# Patient Record
Sex: FEMALE | Race: WHITE | Employment: FULL TIME | URBAN - METROPOLITAN AREA
[De-identification: names, ages, dates, MRNs, and addresses within clinical notes are randomized per-mention and may not be internally consistent; named-entity substitution may affect disease eponyms.]

---

## 2017-02-24 ENCOUNTER — OFFICE VISIT (OUTPATIENT)
Dept: FAMILY MEDICINE | Facility: CLINIC | Age: 52
End: 2017-02-24
Payer: COMMERCIAL

## 2017-02-24 VITALS
HEART RATE: 86 BPM | SYSTOLIC BLOOD PRESSURE: 110 MMHG | DIASTOLIC BLOOD PRESSURE: 58 MMHG | WEIGHT: 163 LBS | TEMPERATURE: 97.1 F | BODY MASS INDEX: 27.83 KG/M2 | HEIGHT: 64 IN

## 2017-02-24 DIAGNOSIS — G89.29 CHRONIC RIGHT SHOULDER PAIN: Primary | ICD-10-CM

## 2017-02-24 DIAGNOSIS — M25.511 CHRONIC RIGHT SHOULDER PAIN: Primary | ICD-10-CM

## 2017-02-24 PROCEDURE — 99213 OFFICE O/P EST LOW 20 MIN: CPT | Performed by: INTERNAL MEDICINE

## 2017-02-24 NOTE — PROGRESS NOTES
"  SUBJECTIVE:                                                    Delores Desai is a 51 year old female who presents to clinic today for the following health issues:      Joint Pain     Onset: right shoulder pain     Description:   Location: right shoulder  Character: aches    Intensity: mild, moderate    Progression of Symptoms: intermittent    Accompanying Signs & Symptoms:  Other symptoms: none   History:   Previous similar pain: YES      Precipitating factors:   Trauma or overuse: no     Alleviating factors:  Improved by: NSAID - letting it hand a certain way  and advil        Therapies Tried and outcome: iced it, thinks it helped     Has had ongoing intermittent right shoulder pain for over a year.  About 5 years ago had rotator cuff issue, this feels similar.  She denies any past injuries. 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  She does not routinely do anything for physical activity.  She describes a dull ache in her right shoulder, some days worse than other.  Today barely has any pain.  Does seem to be better after she takes ibuprofen. There is no numbness, weakness, or pain radiating down the arm.         ROS:  Const, msk, neuro reviewed, negative unless noted above.     OBJECTIVE:                                                    /58 (BP Location: Right arm, Patient Position: Chair, Cuff Size: Adult Regular)  Pulse 86  Temp 97.1  F (36.2  C) (Tympanic)  Ht 5' 4\" (1.626 m)  Wt 163 lb (73.9 kg)  LMP 02/19/2017  BMI 27.98 kg/m2  Body mass index is 27.98 kg/(m^2).    Gen: pleasant, well appearing woman, no distress   R shoulder: non tender. No gross abnormalities. Full ROM with abduction, forward flexion, and internal and external rotation.  Full strength with abduction.  Negative malhotra. Mild pain with neer's test.          ASSESSMENT/PLAN:                                                        1. Chronic right shoulder pain  Likely rotator cuff tedonopathy.   - PARDEEP PT, HAND, AND " CHIROPRACTIC REFERRAL for PT  - naproxen BID daily x 1-2 weeks   - ice     F/U as needed for persistent or worsening symptoms.       Cathi Shi MD  [facility]

## 2017-02-24 NOTE — PATIENT INSTRUCTIONS
You can take Aleve (naproxen) 1-2 tabs twice daily with meals for 1-2 weeks, then take as needed.

## 2017-02-24 NOTE — MR AVS SNAPSHOT
After Visit Summary   2/24/2017    Delores Desai    MRN: [id number]           Patient Information     Date Of Birth          1965        Visit Information        Provider Department      2/24/2017 3:00 PM Cathi Shi MD [facility] [place]        Today's Diagnoses     Chronic right shoulder pain    -  1      Care Instructions    You can take Aleve (naproxen) 1-2 tabs twice daily with meals for 1-2 weeks, then take as needed.         Follow-ups after your visit        Additional Services     PARDEEP PT, HAND, AND CHIROPRACTIC REFERRAL       **This order will print in the [facility] Scheduling Office**    Physical Therapy, Hand Therapy and Chiropractic Care are available through:    *[facility]  *[facility]  *[facility]    Call one number to schedule at any of the above locations: [phone].    Your provider has referred you to: Physical Therapy at [facility] or [facility]    Indication/Reason for Referral: Shoulder Pain  Onset of Illness: >1 year ago  Therapy Orders: Evaluate and Treat  Special Programs: None  Special Request: None    John Lopez      Additional Comments for the Therapist or Chiropractor: concern for rotator cuff tendonitis     Please be aware that coverage of these services is subject to the terms and limitations of your health insurance plan.  Call member services at your health plan with any benefit or coverage questions.      Please bring the following to your appointment:    *Your personal calendar for scheduling future appointments  *Comfortable clothing                  Who to contact     If you have questions or need follow up information about today's clinic visit or your schedule please contact [facility] [place] directly at [phone].  Normal or non-critical lab and imaging results will be communicated to you by MyChart, letter or phone within 4 business days after the clinic has received the  "results. If you do not hear from us within 7 days, please contact the clinic through [facility] or phone. If you have a critical or abnormal lab result, we will notify you by phone as soon as possible.  Submit refill requests through [facility] or call your pharmacy and they will forward the refill request to us. Please allow 3 business days for your refill to be completed.          Additional Information About Your Visit        [facility] Information     [facility] gives you secure access to your electronic health record. If you see a primary care provider, you can also send messages to your care team and make appointments. If you have questions, please call your primary care clinic.  If you do not have a primary care provider, please call [phone] and they will assist you.        Care EveryWhere ID     This is your Care EveryWhere ID. This could be used by other organizations to access your [facility] medical records  [id number]        Your Vitals Were     Pulse Temperature Height Last Period BMI (Body Mass Index)       86 97.1  F (36.2  C) (Tympanic) 5' 4\" (1.626 m) 02/19/2017 27.98 kg/m2        Blood Pressure from Last 3 Encounters:   02/24/17 110/58   07/02/14 96/65   06/23/14 94/63    Weight from Last 3 Encounters:   02/24/17 163 lb (73.9 kg)   07/02/14 162 lb (73.5 kg)   06/23/14 162 lb (73.5 kg)              We Performed the Following     PARDEEP PT, HAND, AND CHIROPRACTIC REFERRAL        Primary Care Provider Office Phone # Fax #    Cathi Shi [phone]       [place] [address] [place] [address] [address]  [place] [address]        Thank you!     Thank you for choosing [facility]  for your care. Our goal is always to provide you with excellent care. Hearing back from our patients is one way we can continue to improve our services. Please take a few minutes to complete the written survey that you may receive in the mail after your visit with us. Thank you!             Your " Updated Medication List - Protect others around you: Learn how to safely use, store and throw away your medicines at www.disposemymeds.org.      Notice  As of 2/24/2017  3:30 PM    You have not been prescribed any medications.

## 2017-05-02 ENCOUNTER — TRANSFERRED RECORDS (OUTPATIENT)
Dept: HEALTH INFORMATION MANAGEMENT | Facility: CLINIC | Age: 52
End: 2017-05-02

## 2017-06-01 ENCOUNTER — TRANSFERRED RECORDS (OUTPATIENT)
Dept: HEALTH INFORMATION MANAGEMENT | Facility: CLINIC | Age: 52
End: 2017-06-01

## 2018-01-02 ENCOUNTER — TELEPHONE (OUTPATIENT)
Dept: FAMILY MEDICINE | Facility: CLINIC | Age: 53
End: 2018-01-02

## 2019-04-18 ENCOUNTER — TELEPHONE (OUTPATIENT)
Dept: FAMILY MEDICINE | Facility: CLINIC | Age: 54
End: 2019-04-18

## 2019-04-18 ENCOUNTER — OFFICE VISIT (OUTPATIENT)
Dept: FAMILY MEDICINE | Facility: CLINIC | Age: 54
End: 2019-04-18
Payer: COMMERCIAL

## 2019-04-18 DIAGNOSIS — J06.9 UPPER RESPIRATORY TRACT INFECTION, UNSPECIFIED TYPE: Primary | ICD-10-CM

## 2019-04-18 PROCEDURE — 99213 OFFICE O/P EST LOW 20 MIN: CPT | Performed by: PHYSICIAN ASSISTANT

## 2019-04-18 RX ORDER — BENZONATATE 100 MG/1
100 CAPSULE ORAL 3 TIMES DAILY PRN
Qty: 30 CAPSULE | Refills: 0 | Status: SHIPPED | OUTPATIENT
Start: 2019-04-18 | End: 2020-12-14

## 2019-04-18 ASSESSMENT — MIFFLIN-ST. JEOR: SCORE: 1334.36

## 2019-04-18 NOTE — TELEPHONE ENCOUNTER
Patient states she started feeling sick on Friday and she just kept getting worse. She states now that every time she finishes coughing she gets dizzy.       Acute Illness   Acute illness concerns: Started with a cold  Onset: Friday     Fever: YES- Monday last fever    Chills/Sweats: no     Headache (location?): YES-     Sinus Pressure:YES    Ear Pain: no     Congestion: YES- head and chest     Sore Throat: no  But yesterday     Lightheaded: yes    Dizziness: yes     Do you feel Faint: just after coughing   Does it feel like the surroundings (bed, room) are moving: YES-   Unsteady/off balance: YES  Have you passed out or fallen: no        Cough: YES-productive of yellow sputum    Wheeze: no     Decreased Appetite: YES-     Nausea: no     Vomiting: no     Diarrhea:  no     Fatigue/Achiness: YES    Sick/Strep Exposure: YES- daughter was sick and  sick      Therapies Tried and outcome: Advil        Informed patient that she hasnt been seen in a year so I would advise that she schedule an office visit. Patient was scheduled today. Advised patient if she feels dizzy she should not drive herself. Pt advised of  recommendations. Pt advised to call the clinic with any further symptoms or changes. Also advised to go to UC or ER if symptoms increase. The patient indicates understanding of these issues and agrees with the plan.    Lorie BUTLER, RN   [facility]

## 2019-04-18 NOTE — PROGRESS NOTES
SUBJECTIVE:   Delores Desai is a 53 year old female who presents to clinic today for the following   health issues:      Note      Patient states she started feeling sick on Friday and she just kept getting worse. She states now that every time she finishes coughing she gets dizzy.         Acute Illness   Acute illness concerns: Started with a cold  Onset: Friday     Fever: YES- Monday last fever, 100 F 4 days ago    Chills/Sweats: no     Headache (location?): YES-     Sinus Pressure:YES    Ear Pain: no     Congestion: YES- head and chest     Sore Throat: no  But yesterday     Lightheaded: yes    Dizziness: yes     Do you feel Faint: just after coughing   Does it feel like the surroundings (bed, room) are moving: YES-   Unsteady/off balance: YES  Have you passed out or fallen: no          Cough: YES-productive of yellow sputum    Wheeze: no     Decreased Appetite: YES-     Nausea: no     Vomiting: no     Diarrhea:  no     Fatigue/Achiness: YES    Sick/Strep Exposure: YES- daughter was sick and  sick       Therapies Tried and outcome: Lady Estrella has been experiencing cough, nasal congestion and body aches over the past 5-6 days.  Symptoms began as a cold but the cough has progressed and has become productive of thick mucous. She is not having wheezing or SOB.  When she coughs she feels lightheaded, this sensation improves after taking some deep breaths.                   Additional history: as documented    Reviewed  and updated as needed this visit by clinical staff         Reviewed and updated as needed this visit by Provider         Patient Active Problem List   Diagnosis     CARDIOVASCULAR SCREENING; LDL GOAL LESS THAN 160     GERD (gastroesophageal reflux disease)     Cough     Past Surgical History:   Procedure Laterality Date     C/SECTION, LOW TRANSVERSE      , Low Transverse     COLONOSCOPY  2011    normal, biopsies: negative for malignancy and microscopic colitis      CT CHEST PULMONARY EMBOLISM W CONTRAST  2014    no acute changes, stable tiny pulmonary nodules left lung base since      CT SCAN CHEST  2005    normal     CTA NECK WITH CONTRAST  2014    negative     ESOPHAGOSCOPY, GASTROSCOPY, DUODENOSCOPY (EGD), COMBINED  2011    small hiatal hernia, biopsies: neg H Pylori, neg for celiac, reactive gastropathy     SONO ABDOMEN  2011    normal     SONO PELVIS  2011    anteroverted uterus with 2.6 mm endometrium, normal ovaries       Social History     Tobacco Use     Smoking status: Former Smoker     Last attempt to quit: 10/5/2010     Years since quittin.5     Smokeless tobacco: Never Used     Tobacco comment: social smoker  - quit completely 10/2010   Substance Use Topics     Alcohol use: Yes     Comment: 0-2 drinks per month on average     Family History   Problem Relation Age of Onset     C.A.D. Paternal Grandmother         age 90     Diabetes Paternal Grandmother         type 2     C.A.D. Paternal Uncle         late 50s and had CABG     Diabetes Paternal Uncle      Cardiovascular Paternal Uncle         carotid artery disease     Cerebrovascular Disease Paternal Uncle      Gastrointestinal Disease Father         gallstone pancreatitis     Gastrointestinal Disease Brother         cholecystectomy     Breast Cancer Mother         onset age 60     Hypertension Mother      Osteoarthritis Mother         TKA and SAGRARIO for osteoarthritis     Breast Cancer Maternal Aunt         x 2, one onset age 50 and one onset age 70s     Cancer - colorectal Maternal Aunt         onset age 68     Cancer Other         mat cousin with esophageal cancer,  age 50         Current Outpatient Medications   Medication Sig Dispense Refill     benzonatate (TESSALON) 100 MG capsule Take 1 capsule (100 mg) by mouth 3 times daily as needed 30 capsule 0     Allergies   Allergen Reactions     No Known Allergies        ROS:  Constitutional, HEENT, cardiovascular, pulmonary, GI, ,  musculoskeletal, neuro, skin, endocrine and psych systems are negative, except as otherwise noted.    OBJECTIVE:     /84   Pulse 88   Temp 98.2  F (36.8  C) (Tympanic)   Resp 16   LMP 11/01/2018   SpO2 97%   There is no height or weight on file to calculate BMI.  GENERAL: healthy, alert and no distress  EYES: Eyes grossly normal to inspection, PERRL and conjunctivae and sclerae normal, EOMI  HENT: ear canals and TM's normal, nose and mouth without ulcers or lesions, no sinus TTP  NECK: no adenopathy, no asymmetry, masses, or scars and thyroid normal to palpation  RESP: lungs clear to auscultation - no rales, rhonchi or wheezes  CV: regular rate and rhythm, normal S1 S2, no S3 or S4, no murmur  NEURO: Normal strength and tone, mentation intact and speech normal, negative floyd-ro pike  PSYCH: mentation appears normal, affect normal/bright      ASSESSMENT/PLAN:       1. Upper respiratory tract infection, unspecified type  Delores's symptoms today are consistent with URI.  I suspect that her dizziness may be secondary to an inner ear cause in the setting of current URI, or due to hyperventilation with coughing. She is neuro intact today.  Will treat symptomatically at this time with cough medication.  If sinus pressure worsening in the next 3-4 days may consider antibiotics  - benzonatate (TESSALON) 100 MG capsule; Take 1 capsule (100 mg) by mouth 3 times daily as needed  Dispense: 30 capsule; Refill: 0    Follow-Up: As above    Geo Bull PA-C  [facility] [place]

## 2019-08-19 VITALS
SYSTOLIC BLOOD PRESSURE: 122 MMHG | HEIGHT: 64 IN | BODY MASS INDEX: 27.83 KG/M2 | OXYGEN SATURATION: 97 % | DIASTOLIC BLOOD PRESSURE: 84 MMHG | TEMPERATURE: 98.2 F | RESPIRATION RATE: 16 BRPM | WEIGHT: 163 LBS | HEART RATE: 88 BPM

## 2019-12-09 ENCOUNTER — HEALTH MAINTENANCE LETTER (OUTPATIENT)
Age: 54
End: 2019-12-09

## 2019-12-17 ENCOUNTER — TELEPHONE (OUTPATIENT)
Dept: FAMILY MEDICINE | Facility: CLINIC | Age: 54
End: 2019-12-17

## 2019-12-17 NOTE — TELEPHONE ENCOUNTER
I spoke with patient, last tetanus was 2011. States she stepped on a nail strip from carpet removal. She does not think the wound was infected. I advised it would be a good idea to update her tetanus and she could walk into the pharmacy to receive this. Patient/ parent verbalized understanding and agrees with plan.    Sonali Fleming RN   [facility] - Triage

## 2019-12-18 ENCOUNTER — ALLIED HEALTH/NURSE VISIT (OUTPATIENT)
Dept: NURSING | Facility: CLINIC | Age: 54
End: 2019-12-18
Payer: COMMERCIAL

## 2019-12-18 DIAGNOSIS — Z23 NEED FOR VACCINATION: Primary | ICD-10-CM

## 2019-12-18 PROCEDURE — 90714 TD VACC NO PRESV 7 YRS+ IM: CPT

## 2019-12-18 PROCEDURE — 99207 ZZC NO CHARGE LOS: CPT

## 2019-12-18 PROCEDURE — 90471 IMMUNIZATION ADMIN: CPT

## 2019-12-26 ENCOUNTER — HOSPITAL ENCOUNTER (EMERGENCY)
Facility: CLINIC | Age: 54
Discharge: HOME | End: 2019-12-26
Attending: EMERGENCY MEDICINE | Admitting: EMERGENCY MEDICINE
Payer: COMMERCIAL

## 2019-12-26 VITALS
HEIGHT: 64 IN | HEART RATE: 86 BPM | OXYGEN SATURATION: 98 % | SYSTOLIC BLOOD PRESSURE: 142 MMHG | RESPIRATION RATE: 16 BRPM | BODY MASS INDEX: 29.71 KG/M2 | DIASTOLIC BLOOD PRESSURE: 78 MMHG | TEMPERATURE: 96.8 F | WEIGHT: 174 LBS

## 2019-12-26 DIAGNOSIS — R07.9 CHEST PAIN, UNSPECIFIED TYPE: ICD-10-CM

## 2019-12-26 LAB
ANION GAP SERPL CALCULATED.3IONS-SCNC: 3 MMOL/L (ref 3–14)
BASOPHILS # BLD AUTO: 0 10E9/L (ref 0–0.2)
BASOPHILS NFR BLD AUTO: 0.5 %
BUN SERPL-MCNC: 21 MG/DL (ref 7–30)
CALCIUM SERPL-MCNC: 9.2 MG/DL (ref 8.5–10.1)
CHLORIDE SERPL-SCNC: 108 MMOL/L (ref 94–109)
CO2 SERPL-SCNC: 27 MMOL/L (ref 20–32)
CREAT SERPL-MCNC: 0.75 MG/DL (ref 0.52–1.04)
D DIMER PPP FEU-MCNC: <0.3 UG/ML FEU (ref 0–0.5)
DIFFERENTIAL METHOD BLD: NORMAL
EOSINOPHIL # BLD AUTO: 0.1 10E9/L (ref 0–0.7)
EOSINOPHIL NFR BLD AUTO: 1.5 %
ERYTHROCYTE [DISTWIDTH] IN BLOOD BY AUTOMATED COUNT: 12.9 % (ref 10–15)
GFR SERPL CREATININE-BSD FRML MDRD: >90 ML/MIN/{1.73_M2}
GLUCOSE SERPL-MCNC: 107 MG/DL (ref 70–99)
HCT VFR BLD AUTO: 41.1 % (ref 35–47)
HGB BLD-MCNC: 14 G/DL (ref 11.7–15.7)
IMM GRANULOCYTES # BLD: 0 10E9/L (ref 0–0.4)
IMM GRANULOCYTES NFR BLD: 0.1 %
INTERPRETATION ECG - MUSE: NORMAL
LYMPHOCYTES # BLD AUTO: 1.5 10E9/L (ref 0.8–5.3)
LYMPHOCYTES NFR BLD AUTO: 19.3 %
MCH RBC QN AUTO: 30.3 PG (ref 26.5–33)
MCHC RBC AUTO-ENTMCNC: 34.1 G/DL (ref 31.5–36.5)
MCV RBC AUTO: 89 FL (ref 78–100)
MONOCYTES # BLD AUTO: 0.7 10E9/L (ref 0–1.3)
MONOCYTES NFR BLD AUTO: 8.5 %
NEUTROPHILS # BLD AUTO: 5.5 10E9/L (ref 1.6–8.3)
NEUTROPHILS NFR BLD AUTO: 70.1 %
NRBC # BLD AUTO: 0 10*3/UL
NRBC BLD AUTO-RTO: 0 /100
PLATELET # BLD AUTO: 388 10E9/L (ref 150–450)
POTASSIUM SERPL-SCNC: 4.2 MMOL/L (ref 3.4–5.3)
RBC # BLD AUTO: 4.62 10E12/L (ref 3.8–5.2)
SODIUM SERPL-SCNC: 138 MMOL/L (ref 133–144)
TROPONIN I SERPL-MCNC: <0.015 UG/L (ref 0–0.04)
WBC # BLD AUTO: 7.9 10E9/L (ref 4–11)

## 2019-12-26 PROCEDURE — 80048 BASIC METABOLIC PNL TOTAL CA: CPT | Performed by: EMERGENCY MEDICINE

## 2019-12-26 PROCEDURE — 93005 ELECTROCARDIOGRAM TRACING: CPT

## 2019-12-26 PROCEDURE — 85025 COMPLETE CBC W/AUTO DIFF WBC: CPT | Performed by: EMERGENCY MEDICINE

## 2019-12-26 PROCEDURE — 85379 FIBRIN DEGRADATION QUANT: CPT | Performed by: EMERGENCY MEDICINE

## 2019-12-26 PROCEDURE — 99284 EMERGENCY DEPT VISIT MOD MDM: CPT

## 2019-12-26 PROCEDURE — 84484 ASSAY OF TROPONIN QUANT: CPT | Performed by: EMERGENCY MEDICINE

## 2019-12-26 ASSESSMENT — ENCOUNTER SYMPTOMS
NUMBNESS: 0
MYALGIAS: 1
ARTHRALGIAS: 1
SHORTNESS OF BREATH: 0
WEAKNESS: 0
LIGHT-HEADEDNESS: 1
DIZZINESS: 1

## 2019-12-26 ASSESSMENT — MIFFLIN-ST. JEOR: SCORE: 1379.26

## 2019-12-26 NOTE — ED AVS SNAPSHOT
Emergency Department  [address] [facility] [address]  Phone:  [phone]  Fax:  [phone]                                    Delores Desai   MRN: [id number]    Department:   Emergency Department   Date of Visit:  12/26/2019           After Visit Summary Signature Page    I have received my discharge instructions, and my questions have been answered. I have discussed any challenges I see with this plan with the nurse or doctor.    ..........................................................................................................................................  Patient/Patient Representative Signature      ..........................................................................................................................................  Patient Representative Print Name and Relationship to Patient    ..................................................               ................................................  Date                                   Time    ..........................................................................................................................................  Reviewed by Signature/Title    ...................................................              ..............................................  Date                                               Time          22EPIC Rev 08/18

## 2019-12-26 NOTE — ED PROVIDER NOTES
"  History     Chief Complaint:  Chest Pain    The history is provided by the patient.      Delores Desai is a 54 year old female who presents with chest pain. She remarks on intermittent episodes of \"sharp\" right-sided localized chest pain lasting for twenty minutes throughout today. She denies history of similar symptoms or pain secondary to cough, exertion, and deep breaths. Independent of her chest pains, she also attest to intermittent episodes of dizziness/lightheadedness over the last several days though states it is currently absent. Several days prior, she acknowledges right sciatic pain and reports radiating pain to \"back of her thigh,\" of which raised concerns for blood clot. Delores also attest to \"hideous belches\" en route. She denies any recent heavy lifting, shortness of breath, unilateral weakness/numbness, or existing back problems.    Allergies:  No Known Drug Allergies     Medications:    Medications reviewed. No current medications.     Past Medical History:    Cough   Genital herpes  GERD  Gestational DM  Pleurisy    Past Surgical History:        Family History:    Gallstones pancreatitis  Breast cancer  HTN  Osteoarthritis  Colorectal cancer    Social History:  Accompanied by daughter.  Former Smoker  Alcohol Use: Positive  Marital Status:       Review of Systems   Respiratory: Negative for shortness of breath.    Cardiovascular: Positive for chest pain (resolved).   Musculoskeletal: Positive for arthralgias and myalgias.   Neurological: Positive for dizziness (resolved) and light-headedness (resolved). Negative for weakness and numbness.   All other systems reviewed and are negative.    Physical Exam   Patient Vitals for the past 24 hrs:   BP Temp Temp src Pulse Resp SpO2 Height Weight   19 1523 -- -- -- -- -- 98 % -- --   19 1521 (!) 142/78 96.8  F (36  C) Temporal 86 16 -- 1.626 m (5' 4\") 78.9 kg (174 lb)     Physical Exam  Eye:  Pupils are equal, round, and " reactive.  Extraocular movements intact.    ENT:  No rhinorrhea.  Moist mucus membranes.  Normal tongue and tonsil.    Cardiac:  Regular rate and rhythm.  No murmurs, gallops, or rubs.    Pulmonary:  Clear to auscultation bilaterally.  No wheezes, rales, or rhonchi.    Abdomen:  Positive bowel sounds.  Abdomen is soft and non-distended, without focal tenderness.    Musculoskeletal:  Normal movement of all extremities without evidence for deficit.    Skin:  Warm and dry without rashes.    Neurologic:  Non-focal exam without asymmetric weakness or numbness.     Psychiatric:  Normal affect with appropriate interaction with examiner.    Emergency Department Course   ECG:  ECG taken at 1528, ECG read at 1709  Normal sinus rhythm  Normal ECG  Rate 88 bpm. NJ interval 138 ms. QRS duration 80 ms. QT/QTc 356/430 ms. P-R-T axes 58 72 55.    Laboratory:  CBC: WBC 7.9, HGB 14.0,   BMP: glc 107 (H) o/w WNL (Creatinine 0.75)    D-dimer: <0.3  Troponin (Collected 1534): <0.015    Emergency Department Course:  Nursing notes and vitals reviewed.  1528 EKG obtained in the ED, see results above.   1534 IV was inserted and blood was drawn for laboratory testing, results above.  1704 I performed an exam of the patient as documented above. Findings and plan explained to the patient. Patient discharged home with instructions regarding supportive care, medications, and reasons to return. The importance of close follow-up was reviewed.    Impression & Plan      Medical Decision Making:  Delores Desai is a 54 year old female who presents to the emergency department today for evaluation of intermittent chest pain that began today.  She notes that these spells are to the right side of the sternum, very short lasting, almost like a sharp pinprick that extinguishes as quickly as it comes.  She denies any associated symptoms with this.  Her chief concern is that she could be dealing with some type of a cardiac or a pulmonary pathology  and presents to us for rule out of these issues.    On my exam, she appears clinically well.  Her vital signs are normal.  I am able to reproduce some of this discomfort with palpation of the right chest wall.  She described having 2 spells of this sharp pain while I was in the room, though there was no change on the cardiac monitor and as she described, symptoms lasted less than a second and then completely dissipated.  EKG is normal.  D-dimer is negative.  Troponin is negative.  The remainder of her labs are reassuring.    At this juncture, I explained to her that I do not believe that this is in any way related to a cardiac or pulmonary embolism source.  Instead, this is most likely related to some form of muscle spasm or even focal esophageal spasm as she notes that it is felt better after she belches.  She feels reassured I feel she is safe for discharge home.  She has a low HEART score and I do not believe she would require provocative testing.  She was advised to follow-up with her primary doctor later in the week if symptoms persist with immediate return to us for any worsening of condition or other emergent concerns.    Diagnosis:    ICD-10-CM   1. Chest pain, unspecified type R07.9     Disposition: Home    Scribe Disclosure:  I, Marc Nahomi, am serving as a scribe at 5:00 PM on 12/26/2019 to document services personally performed by Trierweiler, Chad A, MD based on my observations and the provider's statements to me.     EMERGENCY DEPARTMENT       Trierweiler, Chad A, MD  12/27/19 [id number]

## 2019-12-26 NOTE — ED TRIAGE NOTES
"\" Pt had pain in the sciatic area and down the right post thigh two weeks ago \" has coniued to be off and on and feels weird.    Today starting having sharp chest pain that would last for a quick sec and then go away  For the past hour and a half . Pt concerned about  PE.  "

## 2020-03-05 ENCOUNTER — TRANSFERRED RECORDS (OUTPATIENT)
Dept: HEALTH INFORMATION MANAGEMENT | Facility: CLINIC | Age: 55
End: 2020-03-05

## 2020-03-05 LAB — PAP SMEAR - HIM PATIENT REPORTED: NEGATIVE

## 2020-03-15 ENCOUNTER — HEALTH MAINTENANCE LETTER (OUTPATIENT)
Age: 55
End: 2020-03-15

## 2020-12-14 ENCOUNTER — OFFICE VISIT (OUTPATIENT)
Dept: FAMILY MEDICINE | Facility: CLINIC | Age: 55
End: 2020-12-14
Payer: COMMERCIAL

## 2020-12-14 ENCOUNTER — TELEPHONE (OUTPATIENT)
Dept: FAMILY MEDICINE | Facility: CLINIC | Age: 55
End: 2020-12-14

## 2020-12-14 ENCOUNTER — ANCILLARY PROCEDURE (OUTPATIENT)
Dept: GENERAL RADIOLOGY | Facility: CLINIC | Age: 55
End: 2020-12-14
Attending: NURSE PRACTITIONER
Payer: COMMERCIAL

## 2020-12-14 VITALS
WEIGHT: 175 LBS | OXYGEN SATURATION: 99 % | TEMPERATURE: 97.1 F | SYSTOLIC BLOOD PRESSURE: 114 MMHG | HEIGHT: 64 IN | BODY MASS INDEX: 29.88 KG/M2 | DIASTOLIC BLOOD PRESSURE: 70 MMHG | HEART RATE: 81 BPM

## 2020-12-14 DIAGNOSIS — Z12.31 ENCOUNTER FOR SCREENING MAMMOGRAM FOR MALIGNANT NEOPLASM OF BREAST: ICD-10-CM

## 2020-12-14 DIAGNOSIS — R23.2 HOT FLASHES: ICD-10-CM

## 2020-12-14 DIAGNOSIS — Z12.11 SCREENING FOR COLON CANCER: ICD-10-CM

## 2020-12-14 DIAGNOSIS — R10.13 EPIGASTRIC PAIN: ICD-10-CM

## 2020-12-14 DIAGNOSIS — R09.89 THROAT CLEARING: ICD-10-CM

## 2020-12-14 DIAGNOSIS — R10.13 EPIGASTRIC PAIN: Primary | ICD-10-CM

## 2020-12-14 LAB
BASOPHILS # BLD AUTO: 0 10E9/L (ref 0–0.2)
BASOPHILS NFR BLD AUTO: 0.3 %
DIFFERENTIAL METHOD BLD: NORMAL
EOSINOPHIL # BLD AUTO: 0.1 10E9/L (ref 0–0.7)
EOSINOPHIL NFR BLD AUTO: 1.1 %
ERYTHROCYTE [DISTWIDTH] IN BLOOD BY AUTOMATED COUNT: 13 % (ref 10–15)
HBA1C MFR BLD: 5.6 % (ref 0–5.6)
HCT VFR BLD AUTO: 41 % (ref 35–47)
HGB BLD-MCNC: 13.5 G/DL (ref 11.7–15.7)
LYMPHOCYTES # BLD AUTO: 1.5 10E9/L (ref 0.8–5.3)
LYMPHOCYTES NFR BLD AUTO: 23.6 %
MCH RBC QN AUTO: 30 PG (ref 26.5–33)
MCHC RBC AUTO-ENTMCNC: 32.9 G/DL (ref 31.5–36.5)
MCV RBC AUTO: 91 FL (ref 78–100)
MONOCYTES # BLD AUTO: 0.6 10E9/L (ref 0–1.3)
MONOCYTES NFR BLD AUTO: 9.7 %
NEUTROPHILS # BLD AUTO: 4.2 10E9/L (ref 1.6–8.3)
NEUTROPHILS NFR BLD AUTO: 65.3 %
PLATELET # BLD AUTO: 374 10E9/L (ref 150–450)
RBC # BLD AUTO: 4.5 10E12/L (ref 3.8–5.2)
WBC # BLD AUTO: 6.5 10E9/L (ref 4–11)

## 2020-12-14 PROCEDURE — 83036 HEMOGLOBIN GLYCOSYLATED A1C: CPT | Performed by: NURSE PRACTITIONER

## 2020-12-14 PROCEDURE — 80050 GENERAL HEALTH PANEL: CPT | Performed by: NURSE PRACTITIONER

## 2020-12-14 PROCEDURE — 93000 ELECTROCARDIOGRAM COMPLETE: CPT | Performed by: NURSE PRACTITIONER

## 2020-12-14 PROCEDURE — 99214 OFFICE O/P EST MOD 30 MIN: CPT | Performed by: NURSE PRACTITIONER

## 2020-12-14 PROCEDURE — 71046 X-RAY EXAM CHEST 2 VIEWS: CPT | Performed by: RADIOLOGY

## 2020-12-14 PROCEDURE — 36415 COLL VENOUS BLD VENIPUNCTURE: CPT | Performed by: NURSE PRACTITIONER

## 2020-12-14 RX ORDER — SUCRALFATE ORAL 1 G/10ML
1 SUSPENSION ORAL 4 TIMES DAILY
Qty: 560 ML | Refills: 0 | Status: SHIPPED | OUTPATIENT
Start: 2020-12-14 | End: 2020-12-28

## 2020-12-14 ASSESSMENT — MIFFLIN-ST. JEOR: SCORE: 1378.79

## 2020-12-14 NOTE — Clinical Note
Please abstract the following data from this visit with this patient into the appropriate field in Epic:    Tests that can be patient reported without a hard copy:    Pap smear done on this date: March 5 2020 (approximately), by this group: [facility], results were Normal.   Note to Abstraction: If this section is blank, no results were found via Chart Review/Care Everywhere.

## 2020-12-14 NOTE — PROGRESS NOTES
"Subjective     Delores Desai is a 55 year old female who presents to clinic today for the following health issues:    HPI         GERD/Heartburn  Onset/Duration: 1+ week   Description: tightness in epigastric area (distal end of sternum)  Intensity: mild  Progression of Symptoms: intermittent  Accompanying Signs & Symptoms: discomfort in neck, jaw and teeth last night hot flashes , ,ild dizziness , fatigue throat clearing in the AM   Does it feel like food gets stuck or trouble swallowing: no but is belching   Nausea: no  Vomiting (bloody?): no  Abdominal Pain: no  Black-Tarry stools: no  Bloody stools: no  History:  Previous similar episodes: no  Previous ulcers: no  Precipitating factors:   Caffeine use: no  Alcohol use: no  NSAID/Aspirin use: no  Tobacco use: no  Worse with no particular food or drink.  Alleviating factors: None  Therapies tried and outcome:             Lifestyle changes: None            Medications:     HPI: Delores presents today with a handful of non-specific complaints seemingly emanating from her thoracic region.     Hot flashes 3-5 times per day for the past week. Centered in chest and radiates superiorly into neck.   HR 140s while brushing teeth two weeks ago and elevated HR for no apparent reason during random times.   Feels a \"baseball\" that is \"pulsating\" just below xiphoid process (no pain). Intermittent. This is affected by food intake. She also notes belching with this. History of GERD though not treated presently. Also notes a bit of early satiety.   Yesterday, after a leisurely walk, she noted a discomfort that started in her chest and radiated into jaw / mouth and into left arm (tingling). This passed spontaneously.     No chest pain / pressure. No N/V/D. No diaphoresis.     Her  had COVID in November, but she screening negative.     She does note the perception that she needs to cough / clear her throat for the first few hours after waking up each day.     Hasn't been " "screened for diabetes or thyroid disease in quite some time.     Review of Systems   Constitutional, HEENT, cardiovascular, pulmonary, GI, neuro, endocrine and psych systems are negative, except as otherwise noted.      Objective    /70   Pulse 81   Temp 97.1  F (36.2  C) (Tympanic)   Ht 1.626 m (5' 4\")   Wt 79.4 kg (175 lb)   SpO2 99%   BMI 30.04 kg/m    Body mass index is 30.04 kg/m .  Physical Exam   GENERAL: healthy, alert and no distress  HENT: ear canals and TM's normal, nose and mouth without ulcers or lesions  NECK: no adenopathy, no asymmetry, masses, or scars and thyroid normal to palpation  RESP: lungs clear to auscultation - no rales, rhonchi or wheezes  CV: regular rate and rhythm, normal S1 S2, no S3 or S4, no murmur, click or rub, no peripheral edema and peripheral pulses strong  ABDOMEN: soft, nontender, no hepatosplenomegaly, no masses and bowel sounds normal  NEURO: Normal strength and tone, mentation intact and speech normal    No results found for this or any previous visit (from the past 24 hour(s)).     CXR - Lung fields clear bilaterally. No evidence of infiltrate, pneumothorax, or pleural effusion. Cardiac silhouette normal in appearance.    EKG - Normal Sinus Rhythm, normal axis, normal intervals, no acute ST/T changes c/w ischemia, no LVH by voltage criteria, unchanged from previous tracings        Assessment & Plan     Delores was seen today for epigastric pain, hot flashes, and jaw pain. ECG and CXR reassuring. Could be thyroid issue (or other endocrine issue) given hot flashes. Given epigastric sensation affected by meals, past GERD, early satiety, this could be GERD, PUD, or hiatal hernia. Will trial omeprazole, sucralfate, and avoidance of common culprit foods. Given throat clearing issue, this could be due to occult post-nasal drainage. Will use Flonase, antihistamine, and decongestant to see if these help. Will check TSH, A1c, CBC (to help rule out GI bleeding) and CMP " "(to see if alk phos or ALT, AST are abnormal, which could signify cholestatic process). Will follow up after I have some more of these results, and we can make a plan going forward (based on results and her response to our interventions today).     Diagnoses and all orders for this visit:    Epigastric pain  -     EKG 12-lead complete w/read - Clinics  -     CBC with platelets and differential  -     Comprehensive metabolic panel  -     XR Chest 2 Views; Future  -     sucralfate (CARAFATE) 1 GM/10ML suspension; Take 10 mLs (1 g) by mouth 4 times daily for 14 days    Throat clearing  Comment: Will trial Flonase, decongestant, and antihistamine.     Hot flashes  -     TSH with free T4 reflex  -     Hemoglobin A1c  -     CBC with platelets and differential  -     Comprehensive metabolic panel    Encounter for screening mammogram for malignant neoplasm of breast  -     MA Screen Bilateral w/Jaret; Future    Screening for colon cancer  -     GASTROENTEROLOGY ADULT REF PROCEDURE ONLY; Future         BMI:   Estimated body mass index is 30.04 kg/m  as calculated from the following:    Height as of this encounter: 1.626 m (5' 4\").    Weight as of this encounter: 79.4 kg (175 lb).            See Patient Instructions    Return in about 4 weeks (around 1/11/2021) for persistent or worsening symptoms.    Mitul Alexander NP  [facility]    "

## 2020-12-14 NOTE — PATIENT INSTRUCTIONS
Omeprazole 20 mg daily (OTC) for 28 days  Sucralfate 1 g four times a day    Flonase (fluticasone) (OTC)  Zyrtec (OTC)  Sudafed 12-hour (OTC)

## 2020-12-14 NOTE — TELEPHONE ENCOUNTER
Patient states for the past week and half she has been feeling a tight feeling in upper gastric region (distal end of sternum), hot flashes (different front previous), last night patient went for a light walk and noticed a different feeling into neck and jaw and into teeth.  Patient has also felt dizzy.  Patient is not currently having these symptoms right now.    An appointment was scheduled to be seen in clinic today at 140 pm    LUKE Garner, RN  [employer] [facility]

## 2020-12-15 LAB
ALBUMIN SERPL-MCNC: 3.5 G/DL (ref 3.4–5)
ALP SERPL-CCNC: 48 U/L (ref 40–150)
ALT SERPL W P-5'-P-CCNC: 27 U/L (ref 0–50)
ANION GAP SERPL CALCULATED.3IONS-SCNC: 4 MMOL/L (ref 3–14)
AST SERPL W P-5'-P-CCNC: 16 U/L (ref 0–45)
BILIRUB SERPL-MCNC: 0.2 MG/DL (ref 0.2–1.3)
BUN SERPL-MCNC: 23 MG/DL (ref 7–30)
CALCIUM SERPL-MCNC: 8.9 MG/DL (ref 8.5–10.1)
CHLORIDE SERPL-SCNC: 108 MMOL/L (ref 94–109)
CO2 SERPL-SCNC: 27 MMOL/L (ref 20–32)
CREAT SERPL-MCNC: 0.9 MG/DL (ref 0.52–1.04)
GFR SERPL CREATININE-BSD FRML MDRD: 72 ML/MIN/{1.73_M2}
GLUCOSE SERPL-MCNC: 89 MG/DL (ref 70–99)
POTASSIUM SERPL-SCNC: 4.8 MMOL/L (ref 3.4–5.3)
PROT SERPL-MCNC: 7.7 G/DL (ref 6.8–8.8)
SODIUM SERPL-SCNC: 139 MMOL/L (ref 133–144)
TSH SERPL DL<=0.005 MIU/L-ACNC: 1.04 MU/L (ref 0.4–4)

## 2021-01-14 ENCOUNTER — HEALTH MAINTENANCE LETTER (OUTPATIENT)
Age: 56
End: 2021-01-14

## 2021-03-07 DIAGNOSIS — Z11.59 ENCOUNTER FOR SCREENING FOR OTHER VIRAL DISEASES: ICD-10-CM

## 2021-03-17 ENCOUNTER — NURSE TRIAGE (OUTPATIENT)
Dept: NURSING | Facility: CLINIC | Age: 56
End: 2021-03-17

## 2021-03-17 NOTE — TELEPHONE ENCOUNTER
"Patient report pain in the veins of her neck that started back in Nov/Dec but is calling because it seems worse in the past two weeks. Patient reports pain is intermittent and duration is only a couple of seconds but is occurring through out the day - ie 10 times per day. Pain level is 1-3/10; and reports it feels \"unusual and uncomfortable\" but not painful. Patient notes she had the COVID-19 vaccine on Friday    Reviewed care advice with caller per RN triage protocol guideline. Informed to be seen w/i 3 days; call transferred to schedule. Caller verbalized understanding and agrees with plan.        Additional Information    Negative: Shock suspected (e.g., cold/pale/clammy skin, too weak to stand, low BP, rapid pulse)    Negative: Similar pain previously and it was from 'heart attack'    Negative: Similar pain previously from 'angina' and not relieved by nitroglycerin    Negative: Difficult to awaken or acting confused (e.g., disoriented, slurred speech)    Negative: Sounds like a life-threatening emergency to the triager    Negative: Difficulty breathing or unusual sweating (e.g., sweating without exertion)    Negative: Chest pain lasting longer than 5 minutes    Negative: Stiff neck (can't touch chin to chest) and has headache    Negative: Stiff neck (can't touch chin to chest) and fever    Negative: Weakness of an arm or hand    Negative: Problems with bowel or bladder control    Negative: Patient sounds very sick or weak to the triager    Negative: SEVERE pain (e.g., excruciating, unable to do any normal activities)    Negative: Head is twisting to one side (or ask 'is it turning against your will?')    Negative: Fever > 103 F (39.4 C)    Negative: Fever > 100.0 F (37.8 C) and IVDA (intravenous drug abuse)    Negative: Fever > 100.0 F (37.8 C) and has diabetes mellitus or a weak immune system (e.g., HIV positive, cancer chemotherapy, organ transplant, splenectomy, chronic steroids)    Negative: Numbness in an " arm or hand (i.e., loss of sensation)    Negative: Painful rash with multiple small blisters grouped together (i.e., dermatomal distribution or 'band' or 'stripe')    Negative: High-risk adult (e.g., history of cancer, HIV, or IV drug abuse)    Negative: Patient wants to be seen    Negative: Tenderness in front of neck over windpipe    Negative: MODERATE neck pain (e.g., interferes with normal activities like work or school)    Negative: Pain shoots (radiates) into arm or hand    Neck pain persists > 2 weeks    Protocols used: NECK PAIN OR CBCENYNIC-F-QH

## 2021-03-18 ENCOUNTER — OFFICE VISIT (OUTPATIENT)
Dept: FAMILY MEDICINE | Facility: CLINIC | Age: 56
End: 2021-03-18
Payer: COMMERCIAL

## 2021-03-18 VITALS
BODY MASS INDEX: 30.39 KG/M2 | WEIGHT: 178 LBS | OXYGEN SATURATION: 98 % | TEMPERATURE: 97.7 F | DIASTOLIC BLOOD PRESSURE: 60 MMHG | SYSTOLIC BLOOD PRESSURE: 118 MMHG | HEIGHT: 64 IN | HEART RATE: 81 BPM

## 2021-03-18 DIAGNOSIS — R68.89 NECK SYMPTOM: Primary | ICD-10-CM

## 2021-03-18 DIAGNOSIS — Z13.220 SCREENING FOR LIPID DISORDERS: ICD-10-CM

## 2021-03-18 PROCEDURE — 99214 OFFICE O/P EST MOD 30 MIN: CPT | Performed by: NURSE PRACTITIONER

## 2021-03-18 ASSESSMENT — MIFFLIN-ST. JEOR: SCORE: 1392.4

## 2021-03-18 NOTE — PROGRESS NOTES
"    Assessment & Plan     Neck symptom  Comment: Etiology not evident based on history, exam, and review of records (labs and visit notes). Exam unremarkable and no evidence of carotid bruit. No family history of CVA or known carotid stenosis, but this does remain in the differential (especially given lightheadedness). No evidence of mass (lymph nodes or thyroid nodules). Could also be neck muscle strain. Will consider neck imaging (ultrasound, carotid artery ultrasound, CT). Given that this issue is only two weeks old, will watch and wait for another week or two before deciding on next steps in the workup. She is comfortable with this. Will screen fasting lipids (hasn't had this checked in 7 years), but I believe her to be at lower risk for cerebrovascular disease based on family history, normal BP, normal BG, and non-smoking status. Will follow up next week.    Screening for lipid disorders  - Lipid panel reflex to direct LDL Fasting      BMI:   Estimated body mass index is 30.55 kg/m  as calculated from the following:    Height as of this encounter: 1.626 m (5' 4\").    Weight as of this encounter: 80.7 kg (178 lb).   Weight management plan: Discussed healthy diet and exercise guidelines    See Patient Instructions    Return in about 2 weeks (around 4/1/2021) for persistent or worsening symptoms.    Mitul Alexander NP  [facility] MOJGAN PRAIRIFELISA Estrella is a 55 year old who presents for the following health issues     HPI     Neck lymph does sensation   Onset/Duration: x 2 weeks   Description:   Location: front neck/collar herrmann area and lymph node -pressure like sensation   Radiation: none  Intensity: mild  Progression of Symptoms:  same  Accompanying Signs & Symptoms: lightheaded   Burning, tingling, prickly sensation in arm(s): YES- wrist   Numbness in arm(s): no  Weakness in arm(s):  no  Fever: no  Headache: YES  Nausea and/or vomiting: no  History:   Trauma: no  Previous neck " "pain: no  Previous surgery or injections: no  Previous Imaging (MRI,X ray): no  Precipitating or alleviating factors: None  Does movement impact the pain:  YES  Therapies tried and outcome: nothing    HPI: Delores presents today with the complaint of odd sensation over her anterior neck. She has been noting this several times a day for the past 10 days or so.     Feels like \"blood is having trouble making it over her clavicle\" from her heart.    No pain. \"Feels weird.\" Not sharp or burning.     \"Twinge\" that doesn't last more than 3 seconds or so.     No other symptoms with the exception of some mild lightheadedness.     Occurs on either side (not always both sides concurrently).     No noted mass, fullness, redness, heat, tenderness.     No pattern with regard to activity or position.     No new workout routines or anything of that sort.     Last cholesterol check (at work) demonstrated borderline high.     No diabetes or prediabetes. BP within normal limits.     No known thyroid disease (TSH normal a couple months back).    On no chronic medications.     No family history of CVA. Her Grandma and Dad had heart disease.     Review of Systems   Constitutional, HEENT, cardiovascular, musculoskeletal, neuro, skin, endocrine systems are negative, except as otherwise noted.      Objective    /60   Pulse 81   Temp 97.7  F (36.5  C) (Tympanic)   Ht 1.626 m (5' 4\")   Wt 80.7 kg (178 lb)   LMP 11/01/2018   SpO2 98%   BMI 30.55 kg/m    Body mass index is 30.55 kg/m .  Physical Exam   GENERAL: healthy, alert and no distress  HENT: ear canals and TM's normal, nose and mouth without ulcers or lesions  NECK: no adenopathy, no asymmetry, masses, or scars and thyroid normal to palpation; No carotid bruit noted on R or L  RESP: lungs clear to auscultation - no rales, rhonchi or wheezes  CV: regular rate and rhythm, normal S1 S2, no S3 or S4, no murmur, click or rub, no peripheral edema and peripheral pulses " strong  NEURO: Normal strength and tone, mentation intact and speech normal  PSYCH: mentation appears normal, affect normal/bright    Office Visit on 12/14/2020   Component Date Value Ref Range Status     TSH 12/14/2020 1.04  0.40 - 4.00 mU/L Final     Hemoglobin A1C 12/14/2020 5.6  0 - 5.6 % Final    Comment: Normal <5.7% Prediabetes 5.7-6.4%  Diabetes 6.5% or higher - adopted from ADA   consensus guidelines.       WBC 12/14/2020 6.5  4.0 - 11.0 10e9/L Final     RBC Count 12/14/2020 4.50  3.8 - 5.2 10e12/L Final     Hemoglobin 12/14/2020 13.5  11.7 - 15.7 g/dL Final     Hematocrit 12/14/2020 41.0  35.0 - 47.0 % Final     MCV 12/14/2020 91  78 - 100 fl Final     MCH 12/14/2020 30.0  26.5 - 33.0 pg Final     MCHC 12/14/2020 32.9  31.5 - 36.5 g/dL Final     RDW 12/14/2020 13.0  10.0 - 15.0 % Final     Platelet Count 12/14/2020 374  150 - 450 10e9/L Final     % Neutrophils 12/14/2020 65.3  % Final     % Lymphocytes 12/14/2020 23.6  % Final     % Monocytes 12/14/2020 9.7  % Final     % Eosinophils 12/14/2020 1.1  % Final     % Basophils 12/14/2020 0.3  % Final     Absolute Neutrophil 12/14/2020 4.2  1.6 - 8.3 10e9/L Final     Absolute Lymphocytes 12/14/2020 1.5  0.8 - 5.3 10e9/L Final     Absolute Monocytes 12/14/2020 0.6  0.0 - 1.3 10e9/L Final     Absolute Eosinophils 12/14/2020 0.1  0.0 - 0.7 10e9/L Final     Absolute Basophils 12/14/2020 0.0  0.0 - 0.2 10e9/L Final     Diff Method 12/14/2020 Automated Method   Final     Sodium 12/14/2020 139  133 - 144 mmol/L Final     Potassium 12/14/2020 4.8  3.4 - 5.3 mmol/L Final     Chloride 12/14/2020 108  94 - 109 mmol/L Final     Carbon Dioxide 12/14/2020 27  20 - 32 mmol/L Final     Anion Gap 12/14/2020 4  3 - 14 mmol/L Final     Glucose 12/14/2020 89  70 - 99 mg/dL Final    Non Fasting     Urea Nitrogen 12/14/2020 23  7 - 30 mg/dL Final     Creatinine 12/14/2020 0.90  0.52 - 1.04 mg/dL Final     GFR Estimate 12/14/2020 72  >60 mL/min/[1.73_m2] Final    Comment: Non   American GFR Calc  Starting 12/18/2018, serum creatinine based estimated GFR (eGFR) will be   calculated using the Chronic Kidney Disease Epidemiology Collaboration   (CKD-EPI) equation.       GFR Estimate If Black 12/14/2020 84  >60 mL/min/[1.73_m2] Final    Comment:  GFR Calc  Starting 12/18/2018, serum creatinine based estimated GFR (eGFR) will be   calculated using the Chronic Kidney Disease Epidemiology Collaboration   (CKD-EPI) equation.       Calcium 12/14/2020 8.9  8.5 - 10.1 mg/dL Final     Bilirubin Total 12/14/2020 0.2  0.2 - 1.3 mg/dL Final     Albumin 12/14/2020 3.5  3.4 - 5.0 g/dL Final     Protein Total 12/14/2020 7.7  6.8 - 8.8 g/dL Final     Alkaline Phosphatase 12/14/2020 48  40 - 150 U/L Final     ALT 12/14/2020 27  0 - 50 U/L Final     AST 12/14/2020 16  0 - 45 U/L Final

## 2021-03-18 NOTE — PATIENT INSTRUCTIONS
Watch symptoms closely.    Let me know next week if things change considerably.     The week after that (last week in March), I am out of the office, so let me know your status the next week.

## 2021-03-19 DIAGNOSIS — Z13.220 SCREENING FOR LIPID DISORDERS: ICD-10-CM

## 2021-03-19 LAB
CHOLEST SERPL-MCNC: 163 MG/DL
HDLC SERPL-MCNC: 60 MG/DL
LDLC SERPL CALC-MCNC: 89 MG/DL
NONHDLC SERPL-MCNC: 103 MG/DL
TRIGL SERPL-MCNC: 69 MG/DL

## 2021-03-19 PROCEDURE — 36415 COLL VENOUS BLD VENIPUNCTURE: CPT | Performed by: NURSE PRACTITIONER

## 2021-03-19 PROCEDURE — 80061 LIPID PANEL: CPT | Performed by: NURSE PRACTITIONER

## 2021-03-31 ENCOUNTER — VIRTUAL VISIT (OUTPATIENT)
Dept: FAMILY MEDICINE | Facility: CLINIC | Age: 56
End: 2021-03-31
Payer: COMMERCIAL

## 2021-03-31 DIAGNOSIS — J02.9 PHARYNGITIS, UNSPECIFIED ETIOLOGY: Primary | ICD-10-CM

## 2021-03-31 DIAGNOSIS — R09.81 NASAL CONGESTION: ICD-10-CM

## 2021-03-31 DIAGNOSIS — H92.01 RIGHT EAR PAIN: ICD-10-CM

## 2021-03-31 PROCEDURE — 99213 OFFICE O/P EST LOW 20 MIN: CPT | Mod: GT | Performed by: INTERNAL MEDICINE

## 2021-03-31 RX ORDER — MENTHOL/CETYLPYRD CL 4.5 MG
1 LOZENGE MUCOUS MEMBRANE 4 TIMES DAILY PRN
Qty: 30 LOZENGE | Refills: 0 | Status: SHIPPED | OUTPATIENT
Start: 2021-03-31 | End: 2021-04-16

## 2021-03-31 RX ORDER — AZELASTINE HCL 205.5 UG/1
SPRAY NASAL
Qty: 30 ML | Refills: 0 | Status: SHIPPED | OUTPATIENT
Start: 2021-03-31 | End: 2021-04-16

## 2021-03-31 RX ORDER — FLUTICASONE PROPIONATE 50 MCG
1 SPRAY, SUSPENSION (ML) NASAL DAILY
Qty: 18.2 ML | Refills: 0 | Status: SHIPPED | OUTPATIENT
Start: 2021-03-31 | End: 2021-04-16

## 2021-03-31 RX ORDER — CYCLOBENZAPRINE HCL 5 MG
TABLET ORAL
COMMUNITY
Start: 2021-01-15

## 2021-03-31 NOTE — PATIENT INSTRUCTIONS
As disussed, sent in the medications for your symptoms of the ear due to sinus congestion and check for Strep throat before sending antibiotics as per shared decision today.     =======================    Patient Education     Pharyngitis (Sore Throat), Report Pending     Pharyngitis (sore throat) is often due to a virus. It can also be caused by strep (streptococcus) bacteria. This is often called strep throat. Both viral and strep infections can cause throat pain that is worse when swallowing, aching all over, headache, and fever. Both types of infections are contagious. They may be spread by coughing, kissing, or touching others after touching your mouth or nose.   A test has been done to find out if you or your child have strep throat. Often a rapid strep test can be done which gives immediate results and treatment can begin right away. A throat culture may also be done. The culture results take longer. If you have a virus, the culture results will be negative. The facility will call with your culture results. Call this facility or your healthcare provider if you were not given your rapid test results for strep. If a test is positive for strep infection, you will need to take an antibiotic. An antibiotic is a medicine used to treat a bacterial infection. A prescription can be called into your pharmacy or a written copy will be given to you at that time. If both tests are negative, you likely have a virus, usually viral pharyngitis. This does not need to be treated with antibiotics. Until you receive the results of the rapid strep test or the throat culture, you should stay home from work. If your child is being tested, he or she should stay home from school.   Home care    Rest at home. Drink plenty of fluids so you won't get dehydrated.    If the test is positive for strep, you or your child should not go to work or school for the first 24 hours of taking the antibiotics or as directed by the healthcare  provider. After this time, you or your child will not be contagious. You or your child can then return to work or school when feeling better or as directed by this facility or your healthcare provider.     Use the antibiotic medicine (often penicillin or amoxicillin) for the full 10 days or as directed by the healthcare provider. Don't stop the medicine even if you or your child feel better. This is very important to make sure the infection is fully treated. It's also important to prevent medicine-resistant germs from growing. Treatment for 10 days is also the best way to prevent rheumatic fever which affects the heart and other parts of the body. If you or your child were given an antibiotic shot, the healthcare provider will tell you if additional antibiotics are needed.    Use throat lozenges or numbing throat sprays to help reduce pain. Gargling with warm salt water will also help reduce throat pain. Dissolve 1/2 teaspoon of salt in 1 glass of warm water. Discuss this treatment with your healthcare provider.    Children can sip on juice or ice pops. Children 5 years and older can also suck on a lollipop or hard candy.    Don't eat salty or spicy foods or give them to your child. These can irritate the throat.  Other medicine for a child:  You can give your child acetaminophen for fever, fussiness, or discomfort. In babies over 6 months of age, you may use ibuprofen instead of acetaminophen. If your child has chronic liver or kidney disease or ever had a stomach ulcer or gastrointestinal bleeding, talk with your child s healthcare provider before giving these medicines. Aspirin should never be used by any child under 18 years of age who has a fever. It may cause severe liver damage. Always contact your child's healthcare provider before giving him or her over-the-counter medicines for the first time.   Other medicine for an adult: You may use acetaminophen or ibuprofen to control pain or fever, unless another  "medicine was prescribed for this. If you have chronic liver or kidney disease or ever had a stomach ulcer or gastrointestinal bleeding, talk with your healthcare provider before using these medicines.   Follow-up care  Follow up with your healthcare provider or our staff if you or your child don't feel or get better within 72 hours or as directed.   When to get medical advice  Call your healthcare provider right away if any of these occur:     Your child has a fever (see \"Fever and children,\" below)    You have a fever of 100.4 F (38 C) or higher, or as directed    New or worsening ear pain, sinus pain, or headache    Painful lumps in the back of neck    Stiff or swollen neck    Lymph nodes are getting larger or swelling of the neck    Can t open mouth wide due to throat pain    Signs of dehydration, such as very dark urine or no urine or sunken eyes    Noisy breathing    Muffled voice    New rash    Symptoms are worse    New symptoms develop  Call 911  Call 911 if you have any of the following symptoms     Can't swallow, especially saliva, with a lot of drooling    Trouble or difficulty breathing or wheezing    Feeling faint or dizzy    Can't talk    Feeling of doom  Prevention  Here are steps you can take to help prevent an infection:     Keep good hand washing habits.    Don t have close contact with people who have sore throats, colds, or other upper respiratory infections.    Don t smoke, and stay away from secondhand smoke.    Stay up to date with of your vaccines.  Fever and children  Use a digital thermometer to check your child s temperature. Don t use a mercury thermometer. There are different kinds and uses of digital thermometers. They include:     Rectal. For children younger than 3 years, a rectal temperature is the most accurate.    Forehead (temporal). This works for children age 3 months and older. If a child under 3 months old has signs of illness, this can be used for a first pass. The provider " may want to confirm with a rectal temperature.    Ear (tympanic). Ear temperatures are accurate after 6 months of age, but not before.    Armpit (axillary). This is the least reliable but may be used for a first pass to check a child of any age with signs of illness. The provider may want to confirm with a rectal temperature.    Mouth (oral). Don t use a thermometer in your child s mouth until he or she is at least 4 years old.  Use the rectal thermometer with care. Follow the product maker s directions for correct use. Insert it gently. Label it and make sure it s not used in the mouth. It may pass on germs from the stool. If you don t feel OK using a rectal thermometer, ask the healthcare provider what type to use instead. When you talk with any healthcare provider about your child s fever, tell him or her which type you used.   Below are guidelines to know if your young child has a fever. Your child s healthcare provider may give you different numbers for your child. Follow your provider s specific instructions.   Fever readings for a baby under 3 months old:     First, ask your child s healthcare provider how you should take the temperature.    Rectal or forehead: 100.4 F (38 C) or higher    Armpit: 99 F (37.2 C) or higher  Fever readings for a child age 3 months to 36 months (3 years):     Rectal, forehead, or ear: 102 F (38.9 C) or higher    Armpit: 101 F (38.3 C) or higher  Call the healthcare provider in these cases:    Repeated temperature of 104 F (40 C) or higher in a child of any age    Fever of 100.4  (38 C) or higher in a baby younger than 3 months    Fever that lasts more than 24 hours in a child under age 2    Fever that lasts for 3 days in a child age 2 or older    [employer] last reviewed this educational content on 2/1/2020 2000-2020 [employer]. All rights reserved. This information is not intended as a substitute for professional medical care. Always follow your healthcare  professional's instructions.

## 2021-03-31 NOTE — PROGRESS NOTES
Delores is a 55 year old who is being evaluated via a billable video visit.      How would you like to obtain your AVS? MyChart  If the video visit is dropped, the invitation should be resent by: [phone]   Will anyone else be joining your video visit? No    Video Start Time: Start: 03/31/2021 05:16 pm    Assessment and Plan  1. Pharyngitis, unspecified etiology  - azelastine (ASTEPRO) 0.15 % nasal spray; 1 spray twice a day for 7 days  Dispense: 30 mL; Refill: 0  - fluticasone (FLONASE) 50 MCG/ACT nasal spray; Spray 1 spray into both nostrils daily  Dispense: 18.2 mL; Refill: 0  - Menthol (CEPACOL SORE THROAT) 5.4 MG LOZG; Take 1 each by mouth 4 times daily as needed (Throat pain)  Dispense: 30 lozenge; Refill: 0  - Streptococcus A Rapid Scr w Reflx to PCR; Future    2. Nasal congestion  3. Right ear pain  - azelastine (ASTEPRO) 0.15 % nasal spray; 1 spray twice a day for 7 days  Dispense: 30 mL; Refill: 0  - fluticasone (FLONASE) 50 MCG/ACT nasal spray; Spray 1 spray into both nostrils daily  Dispense: 18.2 mL; Refill: 0     Patient Instructions   As disussed, sent in the medications for your symptoms of the ear due to sinus congestion and check for Strep throat before sending antibiotics as per shared decision today.     =======================    Patient Education     Pharyngitis (Sore Throat), Report Pending     Pharyngitis (sore throat) is often due to a virus. It can also be caused by strep (streptococcus) bacteria. This is often called strep throat. Both viral and strep infections can cause throat pain that is worse when swallowing, aching all over, headache, and fever. Both types of infections are contagious. They may be spread by coughing, kissing, or touching others after touching your mouth or nose.   A test has been done to find out if you or your child have strep throat. Often a rapid strep test can be done which gives immediate results and treatment can begin right away. A throat culture may also  be done. The culture results take longer. If you have a virus, the culture results will be negative. The facility will call with your culture results. Call this facility or your healthcare provider if you were not given your rapid test results for strep. If a test is positive for strep infection, you will need to take an antibiotic. An antibiotic is a medicine used to treat a bacterial infection. A prescription can be called into your pharmacy or a written copy will be given to you at that time. If both tests are negative, you likely have a virus, usually viral pharyngitis. This does not need to be treated with antibiotics. Until you receive the results of the rapid strep test or the throat culture, you should stay home from work. If your child is being tested, he or she should stay home from school.   Home care    Rest at home. Drink plenty of fluids so you won't get dehydrated.    If the test is positive for strep, you or your child should not go to work or school for the first 24 hours of taking the antibiotics or as directed by the healthcare provider. After this time, you or your child will not be contagious. You or your child can then return to work or school when feeling better or as directed by this facility or your healthcare provider.     Use the antibiotic medicine (often penicillin or amoxicillin) for the full 10 days or as directed by the healthcare provider. Don't stop the medicine even if you or your child feel better. This is very important to make sure the infection is fully treated. It's also important to prevent medicine-resistant germs from growing. Treatment for 10 days is also the best way to prevent rheumatic fever which affects the heart and other parts of the body. If you or your child were given an antibiotic shot, the healthcare provider will tell you if additional antibiotics are needed.    Use throat lozenges or numbing throat sprays to help reduce pain. Gargling with warm salt water  "will also help reduce throat pain. Dissolve 1/2 teaspoon of salt in 1 glass of warm water. Discuss this treatment with your healthcare provider.    Children can sip on juice or ice pops. Children 5 years and older can also suck on a lollipop or hard candy.    Don't eat salty or spicy foods or give them to your child. These can irritate the throat.  Other medicine for a child:  You can give your child acetaminophen for fever, fussiness, or discomfort. In babies over 6 months of age, you may use ibuprofen instead of acetaminophen. If your child has chronic liver or kidney disease or ever had a stomach ulcer or gastrointestinal bleeding, talk with your child s healthcare provider before giving these medicines. Aspirin should never be used by any child under 18 years of age who has a fever. It may cause severe liver damage. Always contact your child's healthcare provider before giving him or her over-the-counter medicines for the first time.   Other medicine for an adult: You may use acetaminophen or ibuprofen to control pain or fever, unless another medicine was prescribed for this. If you have chronic liver or kidney disease or ever had a stomach ulcer or gastrointestinal bleeding, talk with your healthcare provider before using these medicines.   Follow-up care  Follow up with your healthcare provider or our staff if you or your child don't feel or get better within 72 hours or as directed.   When to get medical advice  Call your healthcare provider right away if any of these occur:     Your child has a fever (see \"Fever and children,\" below)    You have a fever of 100.4 F (38 C) or higher, or as directed    New or worsening ear pain, sinus pain, or headache    Painful lumps in the back of neck    Stiff or swollen neck    Lymph nodes are getting larger or swelling of the neck    Can t open mouth wide due to throat pain    Signs of dehydration, such as very dark urine or no urine or sunken eyes    Noisy " breathing    Muffled voice    New rash    Symptoms are worse    New symptoms develop  Call 911  Call 911 if you have any of the following symptoms     Can't swallow, especially saliva, with a lot of drooling    Trouble or difficulty breathing or wheezing    Feeling faint or dizzy    Can't talk    Feeling of doom  Prevention  Here are steps you can take to help prevent an infection:     Keep good hand washing habits.    Don t have close contact with people who have sore throats, colds, or other upper respiratory infections.    Don t smoke, and stay away from secondhand smoke.    Stay up to date with of your vaccines.  Fever and children  Use a digital thermometer to check your child s temperature. Don t use a mercury thermometer. There are different kinds and uses of digital thermometers. They include:     Rectal. For children younger than 3 years, a rectal temperature is the most accurate.    Forehead (temporal). This works for children age 3 months and older. If a child under 3 months old has signs of illness, this can be used for a first pass. The provider may want to confirm with a rectal temperature.    Ear (tympanic). Ear temperatures are accurate after 6 months of age, but not before.    Armpit (axillary). This is the least reliable but may be used for a first pass to check a child of any age with signs of illness. The provider may want to confirm with a rectal temperature.    Mouth (oral). Don t use a thermometer in your child s mouth until he or she is at least 4 years old.  Use the rectal thermometer with care. Follow the product maker s directions for correct use. Insert it gently. Label it and make sure it s not used in the mouth. It may pass on germs from the stool. If you don t feel OK using a rectal thermometer, ask the healthcare provider what type to use instead. When you talk with any healthcare provider about your child s fever, tell him or her which type you used.   Below are guidelines to know if  your young child has a fever. Your child s healthcare provider may give you different numbers for your child. Follow your provider s specific instructions.   Fever readings for a baby under 3 months old:     First, ask your child s healthcare provider how you should take the temperature.    Rectal or forehead: 100.4 F (38 C) or higher    Armpit: 99 F (37.2 C) or higher  Fever readings for a child age 3 months to 36 months (3 years):     Rectal, forehead, or ear: 102 F (38.9 C) or higher    Armpit: 101 F (38.3 C) or higher  Call the healthcare provider in these cases:    Repeated temperature of 104 F (40 C) or higher in a child of any age    Fever of 100.4  (38 C) or higher in a baby younger than 3 months    Fever that lasts more than 24 hours in a child under age 2    Fever that lasts for 3 days in a child age 2 or older    [employer] last reviewed this educational content on 2/1/2020 2000-2020 [employer]. All rights reserved. This information is not intended as a substitute for professional medical care. Always follow your healthcare professional's instructions.             Return in about 4 weeks (around 4/28/2021), or if symptoms worsen or fail to improve, for Preventative Visit.    Kim Caraballo MD  [facility] MOJGAN Estrella is a 55 year old who presents for the following health issues     HPI     Acute Illness  Acute illness concerns: sore throat, ear pain   Onset/Duration: yesterday  covid test negative today   Symptoms:  Fever: YES 99.1   Chills/Sweats: YES  Headache (location?): YES  Sinus Pressure: no  Conjunctivitis:  no  Ear Pain: YES: bilateral  Rhinorrhea: yes   Congestion: yes   Sore Throat: YES  Cough: YES  Wheeze: no  Decreased Appetite: no  Nausea: no  Vomiting: no  Diarrhea: no  Dysuria/Freq.: no  Dysuria or Hematuria: no  Fatigue/Achiness: YES  Sick/Strep Exposure: no  Therapies tried and outcome: Advil in the morning        Allergies    Allergen Reactions     No Known Allergies         Past Medical History:   Diagnosis Date     Cough 11/15/2013    cough each fall, responds to albuterol     Genital herpes     rare reoccurence     GERD (gastroesophageal reflux disease)      History of gestational diabetes mellitus, not pregnant      Pleurisy     episodic non-cardiac chest pain     Supervision of other normal pregnancy      - c sections x 2       Past Surgical History:   Procedure Laterality Date     C/SECTION, LOW TRANSVERSE      , Low Transverse     COLONOSCOPY  2011    normal, biopsies: negative for malignancy and microscopic colitis     CT CHEST PULMONARY EMBOLISM W CONTRAST  2014    no acute changes, stable tiny pulmonary nodules left lung base since      CT SCAN CHEST  2005    normal     CTA NECK WITH CONTRAST  2014    negative     ESOPHAGOSCOPY, GASTROSCOPY, DUODENOSCOPY (EGD), COMBINED  2011    small hiatal hernia, biopsies: neg H Pylori, neg for celiac, reactive gastropathy     SONO ABDOMEN  2011    normal     SONO PELVIS  2011    anteroverted uterus with 2.6 mm endometrium, normal ovaries       Family History   Problem Relation Age of Onset     C.A.D. Paternal Grandmother         age 90     Diabetes Paternal Grandmother         type 2     C.A.D. Paternal Uncle         late 50s and had CABG     Diabetes Paternal Uncle      Cardiovascular Paternal Uncle         carotid artery disease     Cerebrovascular Disease Paternal Uncle      Gastrointestinal Disease Father         gallstone pancreatitis     Gastrointestinal Disease Brother         cholecystectomy     Breast Cancer Mother         onset age 60     Hypertension Mother      Osteoarthritis Mother         TKA and SAGRARIO for osteoarthritis     Breast Cancer Maternal Aunt         x 2, one onset age 50 and one onset age 70s     Cancer - colorectal Maternal Aunt         onset age 68     Cancer Other         mat cousin with esophageal cancer,  age 50        Social History     Tobacco Use     Smoking status: Former Smoker     Quit date: 10/5/2010     Years since quitting: 10.4     Smokeless tobacco: Never Used     Tobacco comment: social smoker  - quit completely 10/2010   Substance Use Topics     Alcohol use: Yes     Comment: 0-2 drinks per month on average        Current Outpatient Medications   Medication     azelastine (ASTEPRO) 0.15 % nasal spray     fluticasone (FLONASE) 50 MCG/ACT nasal spray     Menthol (CEPACOL SORE THROAT) 5.4 MG LOZG     cyclobenzaprine (FLEXERIL) 5 MG tablet     No current facility-administered medications for this visit.         Review of Systems   Constitutional, HEENT, cardiovascular, pulmonary, GI, , musculoskeletal, neuro, skin, endocrine and psych systems are negative, except as otherwise noted.      Objective           Vitals:  No vitals were obtained today due to virtual visit.    Physical Exam   GENERAL: Healthy, alert and no distress  EYES: Eyes grossly normal to inspection.  No discharge or erythema, or obvious scleral/conjunctival abnormalities.  RESP: No audible wheeze, cough, or visible cyanosis.  No visible retractions or increased work of breathing.    SKIN: Visible skin clear. No significant rash, abnormal pigmentation or lesions.  NEURO: Cranial nerves grossly intact.  Mentation and speech appropriate for age.  PSYCH: Mentation appears normal, affect normal/bright, judgement and insight intact, normal speech and appearance well-groomed.      Labs and imaging reviewed.      Video-Visit Details    Type of service:  Video Visit    Video End Time:Stop: 03/31/2021 05:29 pm    Originating Location (pt. Location): Home    Distant Location (provider location):  [facility]     Platform used for Video Visit: [email or web address]

## 2021-04-02 ENCOUNTER — ALLIED HEALTH/NURSE VISIT (OUTPATIENT)
Dept: FAMILY MEDICINE | Facility: CLINIC | Age: 56
End: 2021-04-02
Payer: COMMERCIAL

## 2021-04-02 VITALS
DIASTOLIC BLOOD PRESSURE: 60 MMHG | TEMPERATURE: 97 F | OXYGEN SATURATION: 97 % | HEART RATE: 77 BPM | SYSTOLIC BLOOD PRESSURE: 120 MMHG

## 2021-04-02 DIAGNOSIS — J02.9 SORE THROAT: ICD-10-CM

## 2021-04-02 DIAGNOSIS — J02.9 ACUTE PHARYNGITIS, UNSPECIFIED ETIOLOGY: Primary | ICD-10-CM

## 2021-04-02 DIAGNOSIS — J02.9 PHARYNGITIS, UNSPECIFIED ETIOLOGY: ICD-10-CM

## 2021-04-02 LAB
DEPRECATED S PYO AG THROAT QL EIA: NEGATIVE
SPECIMEN SOURCE: NORMAL
SPECIMEN SOURCE: NORMAL
STREP GROUP A PCR: NOT DETECTED

## 2021-04-02 PROCEDURE — 99N1174 PR STATISTIC STREP A RAPID: Performed by: INTERNAL MEDICINE

## 2021-04-02 PROCEDURE — 87651 STREP A DNA AMP PROBE: CPT | Performed by: INTERNAL MEDICINE

## 2021-04-02 PROCEDURE — 99213 OFFICE O/P EST LOW 20 MIN: CPT | Performed by: INTERNAL MEDICINE

## 2021-04-02 RX ORDER — AZITHROMYCIN 250 MG/1
TABLET, FILM COATED ORAL
Qty: 6 TABLET | Refills: 0 | Status: SHIPPED | OUTPATIENT
Start: 2021-04-02 | End: 2021-04-07

## 2021-04-02 NOTE — PROGRESS NOTES
Assessment and Plan  1. Acute pharyngitis, unspecified etiology  Acute problem, patient has not picked up the medications sent in to her pharmacy yesterday on Virtual visit yet. Emphasized that given the Cervical lymphadenopathy and pharyngitis will treat this with Z dustin and nasal sprays already sent to pharmacy. She does have Rt ear wax and given the acute prodrome this will be lavaged in her follow up visit to avoid flare up of infection.   - azithromycin (ZITHROMAX) 250 MG tablet; Take 2 tablets (500 mg) by mouth daily for 1 day, THEN 1 tablet (250 mg) daily for 4 days.  Dispense: 6 tablet; Refill: 0  - Group A Streptococcus PCR Throat Swab    2. Sore throat  3. Pharyngitis, unspecified etiology  - Streptococcus A Rapid Scr w Reflx to PCR     Patient Instructions   As discussed , sent in antibiotic for your Pharyngitis because of Lymph node enlargement. Please do the Throat swab as discussed.   ========================    Patient Education     Pharyngitis (Sore Throat), Report Pending     Pharyngitis (sore throat) is often due to a virus. It can also be caused by strep (streptococcus) bacteria. This is often called strep throat. Both viral and strep infections can cause throat pain that is worse when swallowing, aching all over, headache, and fever. Both types of infections are contagious. They may be spread by coughing, kissing, or touching others after touching your mouth or nose.   A test has been done to find out if you or your child have strep throat. Often a rapid strep test can be done which gives immediate results and treatment can begin right away. A throat culture may also be done. The culture results take longer. If you have a virus, the culture results will be negative. The facility will call with your culture results. Call this facility or your healthcare provider if you were not given your rapid test results for strep. If a test is positive for strep infection, you will need to take an antibiotic.  An antibiotic is a medicine used to treat a bacterial infection. A prescription can be called into your pharmacy or a written copy will be given to you at that time. If both tests are negative, you likely have a virus, usually viral pharyngitis. This does not need to be treated with antibiotics. Until you receive the results of the rapid strep test or the throat culture, you should stay home from work. If your child is being tested, he or she should stay home from school.   Home care    Rest at home. Drink plenty of fluids so you won't get dehydrated.    If the test is positive for strep, you or your child should not go to work or school for the first 24 hours of taking the antibiotics or as directed by the healthcare provider. After this time, you or your child will not be contagious. You or your child can then return to work or school when feeling better or as directed by this facility or your healthcare provider.     Use the antibiotic medicine (often penicillin or amoxicillin) for the full 10 days or as directed by the healthcare provider. Don't stop the medicine even if you or your child feel better. This is very important to make sure the infection is fully treated. It's also important to prevent medicine-resistant germs from growing. Treatment for 10 days is also the best way to prevent rheumatic fever which affects the heart and other parts of the body. If you or your child were given an antibiotic shot, the healthcare provider will tell you if additional antibiotics are needed.    Use throat lozenges or numbing throat sprays to help reduce pain. Gargling with warm salt water will also help reduce throat pain. Dissolve 1/2 teaspoon of salt in 1 glass of warm water. Discuss this treatment with your healthcare provider.    Children can sip on juice or ice pops. Children 5 years and older can also suck on a lollipop or hard candy.    Don't eat salty or spicy foods or give them to your child. These can irritate  "the throat.  Other medicine for a child:  You can give your child acetaminophen for fever, fussiness, or discomfort. In babies over 6 months of age, you may use ibuprofen instead of acetaminophen. If your child has chronic liver or kidney disease or ever had a stomach ulcer or gastrointestinal bleeding, talk with your child s healthcare provider before giving these medicines. Aspirin should never be used by any child under 18 years of age who has a fever. It may cause severe liver damage. Always contact your child's healthcare provider before giving him or her over-the-counter medicines for the first time.   Other medicine for an adult: You may use acetaminophen or ibuprofen to control pain or fever, unless another medicine was prescribed for this. If you have chronic liver or kidney disease or ever had a stomach ulcer or gastrointestinal bleeding, talk with your healthcare provider before using these medicines.   Follow-up care  Follow up with your healthcare provider or our staff if you or your child don't feel or get better within 72 hours or as directed.   When to get medical advice  Call your healthcare provider right away if any of these occur:     Your child has a fever (see \"Fever and children,\" below)    You have a fever of 100.4 F (38 C) or higher, or as directed    New or worsening ear pain, sinus pain, or headache    Painful lumps in the back of neck    Stiff or swollen neck    Lymph nodes are getting larger or swelling of the neck    Can t open mouth wide due to throat pain    Signs of dehydration, such as very dark urine or no urine or sunken eyes    Noisy breathing    Muffled voice    New rash    Symptoms are worse    New symptoms develop  Call 911  Call 911 if you have any of the following symptoms     Can't swallow, especially saliva, with a lot of drooling    Trouble or difficulty breathing or wheezing    Feeling faint or dizzy    Can't talk    Feeling of doom  Prevention  Here are steps you can " take to help prevent an infection:     Keep good hand washing habits.    Don t have close contact with people who have sore throats, colds, or other upper respiratory infections.    Don t smoke, and stay away from secondhand smoke.    Stay up to date with of your vaccines.  Fever and children  Use a digital thermometer to check your child s temperature. Don t use a mercury thermometer. There are different kinds and uses of digital thermometers. They include:     Rectal. For children younger than 3 years, a rectal temperature is the most accurate.    Forehead (temporal). This works for children age 3 months and older. If a child under 3 months old has signs of illness, this can be used for a first pass. The provider may want to confirm with a rectal temperature.    Ear (tympanic). Ear temperatures are accurate after 6 months of age, but not before.    Armpit (axillary). This is the least reliable but may be used for a first pass to check a child of any age with signs of illness. The provider may want to confirm with a rectal temperature.    Mouth (oral). Don t use a thermometer in your child s mouth until he or she is at least 4 years old.  Use the rectal thermometer with care. Follow the product maker s directions for correct use. Insert it gently. Label it and make sure it s not used in the mouth. It may pass on germs from the stool. If you don t feel OK using a rectal thermometer, ask the healthcare provider what type to use instead. When you talk with any healthcare provider about your child s fever, tell him or her which type you used.   Below are guidelines to know if your young child has a fever. Your child s healthcare provider may give you different numbers for your child. Follow your provider s specific instructions.   Fever readings for a baby under 3 months old:     First, ask your child s healthcare provider how you should take the temperature.    Rectal or forehead: 100.4 F (38 C) or higher    Armpit:  99 F (37.2 C) or higher  Fever readings for a child age 3 months to 36 months (3 years):     Rectal, forehead, or ear: 102 F (38.9 C) or higher    Armpit: 101 F (38.3 C) or higher  Call the healthcare provider in these cases:    Repeated temperature of 104 F (40 C) or higher in a child of any age    Fever of 100.4  (38 C) or higher in a baby younger than 3 months    Fever that lasts more than 24 hours in a child under age 2    Fever that lasts for 3 days in a child age 2 or older    [employer] last reviewed this educational content on 2/1/2020 2000-2020 [employer]. All rights reserved. This information is not intended as a substitute for professional medical care. Always follow your healthcare professional's instructions.                   Return in about 2 weeks (around 4/16/2021), or if symptoms worsen or fail to improve, for Preventative Visit.    Kim Caraballo MD  [facility] MOJGAN Estrella is a 55 year old who presents for the following health issues       HPI     Acute Illness  Acute illness concerns:   Onset/Duration: 4 days  Symptoms:  Fever: YES- but gone now high 90's  Chills/Sweats: no  Headache (location?): YES  Sinus Pressure: no  Conjunctivitis:  YES  Ear Pain: YES: right  Rhinorrhea: YES  Congestion: no  Sore Throat: YES- right gland  Cough: no  Wheeze: no  Decreased Appetite: no  Nausea: no  Vomiting: no  Diarrhea: no  Dysuria/Freq.: no  Dysuria or Hematuria: no  Fatigue/Achiness: no  Sick/Strep Exposure: no  Therapies tried and outcome: None       Allergies   Allergen Reactions     No Known Allergies         Past Medical History:   Diagnosis Date     Cough 11/15/2013    cough each fall, responds to albuterol     Genital herpes     rare reoccurence     GERD (gastroesophageal reflux disease) 2011     History of gestational diabetes mellitus, not pregnant      Pleurisy     episodic non-cardiac chest pain     Supervision of other normal  pregnancy      - c sections x 2       Past Surgical History:   Procedure Laterality Date     C/SECTION, LOW TRANSVERSE      , Low Transverse     COLONOSCOPY  2011    normal, biopsies: negative for malignancy and microscopic colitis     CT CHEST PULMONARY EMBOLISM W CONTRAST  2014    no acute changes, stable tiny pulmonary nodules left lung base since      CT SCAN CHEST  2005    normal     CTA NECK WITH CONTRAST  2014    negative     ESOPHAGOSCOPY, GASTROSCOPY, DUODENOSCOPY (EGD), COMBINED  2011    small hiatal hernia, biopsies: neg H Pylori, neg for celiac, reactive gastropathy     SONO ABDOMEN  2011    normal     SONO PELVIS  2011    anteroverted uterus with 2.6 mm endometrium, normal ovaries       Family History   Problem Relation Age of Onset     Breast Cancer Mother         onset age 60     Hypertension Mother      Osteoarthritis Mother         TKA and SAGRARIO for osteoarthritis     Gastrointestinal Disease Father         gallstone pancreatitis     C.A.D. Paternal Grandmother         age 90     Diabetes Paternal Grandmother         type 2     C.A.D. Paternal Uncle         late 50s and had CABG     Diabetes Paternal Uncle      Cardiovascular Paternal Uncle         carotid artery disease     Cerebrovascular Disease Paternal Uncle      Gastrointestinal Disease Brother         cholecystectomy     Breast Cancer Maternal Aunt         x 2, one onset age 50 and one onset age 70s     Cancer - colorectal Maternal Aunt         onset age 68     Cancer Other         mat cousin with esophageal cancer,  age 50       Social History     Tobacco Use     Smoking status: Former Smoker     Quit date: 10/5/2010     Years since quitting: 10.4     Smokeless tobacco: Never Used     Tobacco comment: social smoker  - quit completely 10/2010   Substance Use Topics     Alcohol use: Yes     Comment: 0-2 drinks per month on average        Current Outpatient Medications   Medication     azithromycin  (ZITHROMAX) 250 MG tablet     azelastine (ASTEPRO) 0.15 % nasal spray     cyclobenzaprine (FLEXERIL) 5 MG tablet     fluticasone (FLONASE) 50 MCG/ACT nasal spray     Menthol (CEPACOL SORE THROAT) 5.4 MG LOZG     No current facility-administered medications for this visit.         Review of Systems   Constitutional, HEENT, cardiovascular, pulmonary, GI, , musculoskeletal, neuro, skin, endocrine and psych systems are negative, except as otherwise noted.      Objective    /60 (Cuff Size: Adult Large)   Pulse 77   Temp 97  F (36.1  C) (Tympanic)   LMP 11/01/2018   SpO2 97%   There is no height or weight on file to calculate BMI.  Physical Exam   GENERAL: healthy, alert and no distress  ENT Exam : Ear canals and TM's POSITIVE for Rt ear cerumen, nose and mouth without ulcers or lesions, Positive for pharyngeal erythema, and Rt Cervical lymphadenopathy .  NECK: no adenopathy, no asymmetry, masses, or scars and thyroid normal to palpation  RESP: lungs clear to auscultation - no rales, rhonchi or wheezes  CV: regular rate and rhythm, normal S1 S2, no S3 or S4, no murmur, click or rub, no peripheral edema and peripheral pulses strong  ABDOMEN: soft, nontender, no hepatosplenomegaly, no masses and bowel sounds normal  MS: no gross musculoskeletal defects noted, no edema    Labs and imaging reviewed and discussed with the patient.

## 2021-04-02 NOTE — Clinical Note
"Hi Rebecca,   I saw this patient in Office visit and documented as usual but on recheck on my patients its showing on Epic as \" Allied Nurse visit \" I dont know how to fix this. Can you help ?     Thank you,  Kim Caraballo MD on 4/5/2021 at 12:18 PM "

## 2021-04-02 NOTE — PATIENT INSTRUCTIONS
As discussed , sent in antibiotic for your Pharyngitis because of Lymph node enlargement. Please do the Throat swab as discussed.   ========================    Patient Education     Pharyngitis (Sore Throat), Report Pending     Pharyngitis (sore throat) is often due to a virus. It can also be caused by strep (streptococcus) bacteria. This is often called strep throat. Both viral and strep infections can cause throat pain that is worse when swallowing, aching all over, headache, and fever. Both types of infections are contagious. They may be spread by coughing, kissing, or touching others after touching your mouth or nose.   A test has been done to find out if you or your child have strep throat. Often a rapid strep test can be done which gives immediate results and treatment can begin right away. A throat culture may also be done. The culture results take longer. If you have a virus, the culture results will be negative. The facility will call with your culture results. Call this facility or your healthcare provider if you were not given your rapid test results for strep. If a test is positive for strep infection, you will need to take an antibiotic. An antibiotic is a medicine used to treat a bacterial infection. A prescription can be called into your pharmacy or a written copy will be given to you at that time. If both tests are negative, you likely have a virus, usually viral pharyngitis. This does not need to be treated with antibiotics. Until you receive the results of the rapid strep test or the throat culture, you should stay home from work. If your child is being tested, he or she should stay home from school.   Home care    Rest at home. Drink plenty of fluids so you won't get dehydrated.    If the test is positive for strep, you or your child should not go to work or school for the first 24 hours of taking the antibiotics or as directed by the healthcare provider. After this time, you or your child will  not be contagious. You or your child can then return to work or school when feeling better or as directed by this facility or your healthcare provider.     Use the antibiotic medicine (often penicillin or amoxicillin) for the full 10 days or as directed by the healthcare provider. Don't stop the medicine even if you or your child feel better. This is very important to make sure the infection is fully treated. It's also important to prevent medicine-resistant germs from growing. Treatment for 10 days is also the best way to prevent rheumatic fever which affects the heart and other parts of the body. If you or your child were given an antibiotic shot, the healthcare provider will tell you if additional antibiotics are needed.    Use throat lozenges or numbing throat sprays to help reduce pain. Gargling with warm salt water will also help reduce throat pain. Dissolve 1/2 teaspoon of salt in 1 glass of warm water. Discuss this treatment with your healthcare provider.    Children can sip on juice or ice pops. Children 5 years and older can also suck on a lollipop or hard candy.    Don't eat salty or spicy foods or give them to your child. These can irritate the throat.  Other medicine for a child:  You can give your child acetaminophen for fever, fussiness, or discomfort. In babies over 6 months of age, you may use ibuprofen instead of acetaminophen. If your child has chronic liver or kidney disease or ever had a stomach ulcer or gastrointestinal bleeding, talk with your child s healthcare provider before giving these medicines. Aspirin should never be used by any child under 18 years of age who has a fever. It may cause severe liver damage. Always contact your child's healthcare provider before giving him or her over-the-counter medicines for the first time.   Other medicine for an adult: You may use acetaminophen or ibuprofen to control pain or fever, unless another medicine was prescribed for this. If you have chronic  "liver or kidney disease or ever had a stomach ulcer or gastrointestinal bleeding, talk with your healthcare provider before using these medicines.   Follow-up care  Follow up with your healthcare provider or our staff if you or your child don't feel or get better within 72 hours or as directed.   When to get medical advice  Call your healthcare provider right away if any of these occur:     Your child has a fever (see \"Fever and children,\" below)    You have a fever of 100.4 F (38 C) or higher, or as directed    New or worsening ear pain, sinus pain, or headache    Painful lumps in the back of neck    Stiff or swollen neck    Lymph nodes are getting larger or swelling of the neck    Can t open mouth wide due to throat pain    Signs of dehydration, such as very dark urine or no urine or sunken eyes    Noisy breathing    Muffled voice    New rash    Symptoms are worse    New symptoms develop  Call 911  Call 911 if you have any of the following symptoms     Can't swallow, especially saliva, with a lot of drooling    Trouble or difficulty breathing or wheezing    Feeling faint or dizzy    Can't talk    Feeling of doom  Prevention  Here are steps you can take to help prevent an infection:     Keep good hand washing habits.    Don t have close contact with people who have sore throats, colds, or other upper respiratory infections.    Don t smoke, and stay away from secondhand smoke.    Stay up to date with of your vaccines.  Fever and children  Use a digital thermometer to check your child s temperature. Don t use a mercury thermometer. There are different kinds and uses of digital thermometers. They include:     Rectal. For children younger than 3 years, a rectal temperature is the most accurate.    Forehead (temporal). This works for children age 3 months and older. If a child under 3 months old has signs of illness, this can be used for a first pass. The provider may want to confirm with a rectal temperature.    Ear " (tympanic). Ear temperatures are accurate after 6 months of age, but not before.    Armpit (axillary). This is the least reliable but may be used for a first pass to check a child of any age with signs of illness. The provider may want to confirm with a rectal temperature.    Mouth (oral). Don t use a thermometer in your child s mouth until he or she is at least 4 years old.  Use the rectal thermometer with care. Follow the product maker s directions for correct use. Insert it gently. Label it and make sure it s not used in the mouth. It may pass on germs from the stool. If you don t feel OK using a rectal thermometer, ask the healthcare provider what type to use instead. When you talk with any healthcare provider about your child s fever, tell him or her which type you used.   Below are guidelines to know if your young child has a fever. Your child s healthcare provider may give you different numbers for your child. Follow your provider s specific instructions.   Fever readings for a baby under 3 months old:     First, ask your child s healthcare provider how you should take the temperature.    Rectal or forehead: 100.4 F (38 C) or higher    Armpit: 99 F (37.2 C) or higher  Fever readings for a child age 3 months to 36 months (3 years):     Rectal, forehead, or ear: 102 F (38.9 C) or higher    Armpit: 101 F (38.3 C) or higher  Call the healthcare provider in these cases:    Repeated temperature of 104 F (40 C) or higher in a child of any age    Fever of 100.4  (38 C) or higher in a baby younger than 3 months    Fever that lasts more than 24 hours in a child under age 2    Fever that lasts for 3 days in a child age 2 or older    [employer] last reviewed this educational content on 2/1/2020 2000-2020 [employer]. All rights reserved. This information is not intended as a substitute for professional medical care. Always follow your healthcare professional's instructions.

## 2021-04-16 ENCOUNTER — NURSE TRIAGE (OUTPATIENT)
Dept: FAMILY MEDICINE | Facility: CLINIC | Age: 56
End: 2021-04-16

## 2021-04-16 ENCOUNTER — OFFICE VISIT (OUTPATIENT)
Dept: FAMILY MEDICINE | Facility: CLINIC | Age: 56
End: 2021-04-16
Payer: COMMERCIAL

## 2021-04-16 ENCOUNTER — MYC MEDICAL ADVICE (OUTPATIENT)
Dept: FAMILY MEDICINE | Facility: CLINIC | Age: 56
End: 2021-04-16

## 2021-04-16 VITALS
RESPIRATION RATE: 16 BRPM | WEIGHT: 177.8 LBS | BODY MASS INDEX: 30.52 KG/M2 | HEART RATE: 70 BPM | TEMPERATURE: 97.3 F | SYSTOLIC BLOOD PRESSURE: 120 MMHG | DIASTOLIC BLOOD PRESSURE: 70 MMHG | OXYGEN SATURATION: 97 %

## 2021-04-16 DIAGNOSIS — R20.2 PARESTHESIAS: Primary | ICD-10-CM

## 2021-04-16 LAB
ALBUMIN SERPL-MCNC: 3.5 G/DL (ref 3.4–5)
ALP SERPL-CCNC: 50 U/L (ref 40–150)
ALT SERPL W P-5'-P-CCNC: 22 U/L (ref 0–50)
ANION GAP SERPL CALCULATED.3IONS-SCNC: 4 MMOL/L (ref 3–14)
AST SERPL W P-5'-P-CCNC: 17 U/L (ref 0–45)
BASOPHILS # BLD AUTO: 0 10E9/L (ref 0–0.2)
BASOPHILS NFR BLD AUTO: 0.4 %
BILIRUB SERPL-MCNC: 0.3 MG/DL (ref 0.2–1.3)
BUN SERPL-MCNC: 16 MG/DL (ref 7–30)
CALCIUM SERPL-MCNC: 8.9 MG/DL (ref 8.5–10.1)
CHLORIDE SERPL-SCNC: 108 MMOL/L (ref 94–109)
CO2 SERPL-SCNC: 28 MMOL/L (ref 20–32)
CREAT SERPL-MCNC: 0.73 MG/DL (ref 0.52–1.04)
CRP SERPL-MCNC: <2.9 MG/L (ref 0–8)
DIFFERENTIAL METHOD BLD: NORMAL
EOSINOPHIL # BLD AUTO: 0.1 10E9/L (ref 0–0.7)
EOSINOPHIL NFR BLD AUTO: 1 %
ERYTHROCYTE [DISTWIDTH] IN BLOOD BY AUTOMATED COUNT: 12.6 % (ref 10–15)
ERYTHROCYTE [SEDIMENTATION RATE] IN BLOOD BY WESTERGREN METHOD: 14 MM/H (ref 0–30)
FERRITIN SERPL-MCNC: 70 NG/ML (ref 8–252)
GFR SERPL CREATININE-BSD FRML MDRD: >90 ML/MIN/{1.73_M2}
GLUCOSE SERPL-MCNC: 103 MG/DL (ref 70–99)
HCT VFR BLD AUTO: 40 % (ref 35–47)
HGB BLD-MCNC: 13.5 G/DL (ref 11.7–15.7)
LYMPHOCYTES # BLD AUTO: 1.5 10E9/L (ref 0.8–5.3)
LYMPHOCYTES NFR BLD AUTO: 22.4 %
MAGNESIUM SERPL-MCNC: 2.4 MG/DL (ref 1.6–2.3)
MCH RBC QN AUTO: 30.6 PG (ref 26.5–33)
MCHC RBC AUTO-ENTMCNC: 33.8 G/DL (ref 31.5–36.5)
MCV RBC AUTO: 91 FL (ref 78–100)
MONOCYTES # BLD AUTO: 0.5 10E9/L (ref 0–1.3)
MONOCYTES NFR BLD AUTO: 7 %
NEUTROPHILS # BLD AUTO: 4.7 10E9/L (ref 1.6–8.3)
NEUTROPHILS NFR BLD AUTO: 69.2 %
PLATELET # BLD AUTO: 373 10E9/L (ref 150–450)
POTASSIUM SERPL-SCNC: 4 MMOL/L (ref 3.4–5.3)
PROT SERPL-MCNC: 7.6 G/DL (ref 6.8–8.8)
RBC # BLD AUTO: 4.41 10E12/L (ref 3.8–5.2)
SODIUM SERPL-SCNC: 140 MMOL/L (ref 133–144)
VIT B12 SERPL-MCNC: 385 PG/ML (ref 193–986)
WBC # BLD AUTO: 6.8 10E9/L (ref 4–11)

## 2021-04-16 PROCEDURE — 85025 COMPLETE CBC W/AUTO DIFF WBC: CPT | Performed by: PHYSICIAN ASSISTANT

## 2021-04-16 PROCEDURE — 99214 OFFICE O/P EST MOD 30 MIN: CPT | Performed by: PHYSICIAN ASSISTANT

## 2021-04-16 PROCEDURE — 82728 ASSAY OF FERRITIN: CPT | Performed by: PHYSICIAN ASSISTANT

## 2021-04-16 PROCEDURE — 85652 RBC SED RATE AUTOMATED: CPT | Performed by: PHYSICIAN ASSISTANT

## 2021-04-16 PROCEDURE — 80053 COMPREHEN METABOLIC PANEL: CPT | Performed by: PHYSICIAN ASSISTANT

## 2021-04-16 PROCEDURE — 82306 VITAMIN D 25 HYDROXY: CPT | Performed by: PHYSICIAN ASSISTANT

## 2021-04-16 PROCEDURE — 83735 ASSAY OF MAGNESIUM: CPT | Performed by: PHYSICIAN ASSISTANT

## 2021-04-16 PROCEDURE — 36415 COLL VENOUS BLD VENIPUNCTURE: CPT | Performed by: PHYSICIAN ASSISTANT

## 2021-04-16 PROCEDURE — 86618 LYME DISEASE ANTIBODY: CPT | Performed by: PHYSICIAN ASSISTANT

## 2021-04-16 PROCEDURE — 82607 VITAMIN B-12: CPT | Performed by: PHYSICIAN ASSISTANT

## 2021-04-16 PROCEDURE — 86140 C-REACTIVE PROTEIN: CPT | Performed by: PHYSICIAN ASSISTANT

## 2021-04-16 NOTE — TELEPHONE ENCOUNTER
"Called patient to triage symptoms based on Jan Medical message:   Richard Bauman, I am following up from an appointment in March.  I was in because of an uncomfortable sensation in my neck/vein.  I continue to have this sensation and it has consistently been on the right side of my neck for over a week.  It is sometimes a little itchy..?     I have also been experiencing an uncomfortable feeling in my right leg when I lay down at night.  There is a very strange sensation behind my knee - sometimes also up the back of my thigh.  I would not describe it as pain, but  very uncomfortable.  I notice nothing during the day when standing, but feel the sensation almost right away when laying down at night - or even when sitting with my legs up during the day.     When I was in you mentioned possibly doing ultra sound or something else to investigate my neck.  Is this something you think we should consider?    Symptoms are not worse from when she was evaluated, but \"it feels strange.\"     Neck Sensation: Patient reports this has continued and she is interested in doing ultrasound. Per last OV note, instructed to follow-up if symptoms continued. States it has become slightly itchy.     Leg Discomfort: Pt reports knee achey pain behind her knee that goes up into her thigh. Worse at night when she is laying/sitting. Rates as 3-4/10. Ongoing for last ~1.5 weeks. Per assessment over the phone, negative homans sign. Denies swelling, redness. No recent injury.     RN advised appointment with provider to follow-up. Scheduled with Ruba Martínez.    Additional Information    Negative: Sounds like a life-threatening emergency to the triager    Negative: Followed a knee injury    Answer Assessment - Initial Assessment Questions  1. LOCATION and RADIATION: \"Where is the pain located?\"     Right leg behind knee, up into back of thigh          2. QUALITY: \"What does the pain feel like?\"  (e.g., sharp, dull, aching, burning)     achey feeling " "  Feels better with movement / stretching       3. SEVERITY: \"How bad is the pain?\" \"What does it keep you from doing?\"   (Scale 1-10; or mild, moderate, severe)    -  MILD (1-3): doesn't interfere with normal activities     -  MODERATE (4-7): interferes with normal activities (e.g., work or school) or awakens from sleep, limping     -  SEVERE (8-10): excruciating pain, unable to do any normal activities, unable to walk    Rates it as 3-4/10        4. ONSET: \"When did the pain start?\" \"Does it come and go, or is it there all the time?\"        Leg symptoms 1-1.5 weeks ago  Neck symptoms since March     5. RECURRENT: \"Have you had this pain before?\" If so, ask: \"When, and what happened then?\"    Neck symptoms never got better, has been constant. States she did have a bad sore throat/ swollen gland. Was given z-pack. That got better. No COVID at that time. Sore throat and gland improved.         6. SETTING: \"Has there been any recent work, exercise or other activity that involved that part of the body?\"     No       More time sitting than in the past due to working from home     7. AGGRAVATING FACTORS: \"What makes the knee pain worse?\" (e.g., walking, climbing stairs, running)    No - makes it better  Sitting/laying makes it worse         8. ASSOCIATED SYMPTOMS: \"Is there any swelling or redness of the knee?\"    Unsure      Has not noticed any swelling     9. OTHER SYMPTOMS: \"Do you have any other symptoms?\" (e.g., chest pain, difficulty breathing, fever, calf pain)    Thigh pain  Neck symptom - sensitive/itch by carotid artery - evaluated in the past      States a few times it feels itchy and has gently scratched it.    10. PREGNANCY: \"Is there any chance you are pregnant?\" \"When was your last menstrual period?\"    no    Protocols used: KNEE PAIN-A-OH      "

## 2021-04-16 NOTE — PROGRESS NOTES
"    Assessment & Plan       ICD-10-CM    1. Paresthesias  R20.2 CBC with platelets and differential     Vitamin B12     CRP, inflammation     ESR: Erythrocyte sedimentation rate     Ferritin     Comprehensive metabolic panel (BMP + Alb, Alk Phos, ALT, AST, Total. Bili, TP)     Lyme Disease Santa with reflex to WB Serum     Vitamin D Deficiency     Magnesium     - Low suspicion for vascular issue based on exam and sx; no masses on exam. Sx most consistent with paresthesias, no obvious etiology. Will screen with blood tests as above. Discussed checking brain MRI given right-sided widespread symptoms, but patient declines at this time which I feel is reasonable given lack of red flag findings on exam.    30 minutes spent on the date of the encounter doing chart review, history and exam, documentation and further activities as noted above    Return in about 1 week (around 4/23/2021), or if symptoms worsen or fail to improve.    ZACKARY Kim [facility] MOJGAN Estrella is a 55 year old who presents for the following health issues     HPI     Musculoskeletal problem/pain  Onset/Duration: one to two weeks  Description  Location: knee - right, behind knee  Joint Swelling: no  Redness: no  Pain: uncomfortable  Warmth: no  Intensity:  moderate  Progression of Symptoms:  same  Accompanying signs and symptoms:   Fevers: no  Numbness/tingling/weakness: no  History  Trauma to the area: no  Recent illness:  no  Previous similar problem: no  Previous evaluation:  no  Precipitating or alleviating factors:  Aggravating factors include: none  Therapies tried and outcome: nothing    - Patient seen 3/18/2021 by MLL for neck symptoms, which have persisted. Now localized to right side. Feels occasionally itchy. Continues to feel like \"blood has a difficult time getting over my collar bone\" or that \"a vein is going to burst open.\"  - Notes intermittent symptoms in posterior Rt knee and thigh " "x1-2 weeks. Sometimes feels like a \"pinch\", sometimes \"achey\", and other times just an \"odd sensation.\" Stretching doesn't help. Happens only when laying in bed or when sitting with legs elevated.    Review of Systems   Constitutional, HEENT, cardiovascular, pulmonary, GI, , musculoskeletal, neuro, skin, endocrine and psych systems are negative, except as otherwise noted.      Objective    /70   Pulse 70   Temp 97.3  F (36.3  C) (Tympanic)   Resp 16   Wt 80.6 kg (177 lb 12.8 oz)   LMP 11/01/2018   SpO2 97%   BMI 30.52 kg/m    Body mass index is 30.52 kg/m .  Physical Exam   GENERAL:  WDWN, no acute distress  PSYCH: pleasant, cooperative  EYES: no discharge, no injection  HENT:  Normocephalic. Moist mucus membranes.  NECK:  Supple, symmetric, no DUANE or masses. No bruits  LUNGS:  Clear to auscultation bilaterally without rhonchi, rales, or wheeze. Chest rise symmetric and no tenderness to palpation.  HEART:  Regular rate & rhythm. No murmur, gallop, or rub.  EXTREMITIES:  No gross deformities, moves all 4 limbs spontaneously, no peripheral edema. Patient withdraws from palpation intermittently to posterior knee and thigh; states it isn't painful to palpable but that \"it seems like it's going to feel odd to the touch\"  SKIN:  Warm and dry, no rash or suspicious lesions    NEUROLOGIC:  A&Ox3. CN 2-12 grossly intact, sensation grossly intact. DTRs 2+ bilat                "

## 2021-04-17 LAB — B BURGDOR IGG+IGM SER QL: 0.06 (ref 0–0.89)

## 2021-04-18 LAB — DEPRECATED CALCIDIOL+CALCIFEROL SERPL-MC: 26 UG/L (ref 20–75)

## 2021-04-19 NOTE — RESULT ENCOUNTER NOTE
Richard Estrella,    I just wanted to let you know that your lab results have been reviewed and are attached.    - Your metabolic panel shows:  normal electrolytes (sodium, potassium, calcium, magnesium) normal kidney function (creatinine and GFR) normal liver function (AST/ALT)  - Your Vitamin D level is normal.  - Your Blood Count Results show normal White Blood Cell count (no sign of infection), normal Hemoglobin (not anemia), and normal Platelets (affects clotting).  - Your iron levels are normal.  - Your B12 levels are normal.  - Your CRP is normal (marker of inflammation/infection).  -  Your Sed Rate is normal (marker of inflammation/infection)  - Your Lyme Disease Antibody was negative for infection.  - Please ignore any other labs that are flagged as high or low that I have not mentioned. These are normal variations and not a cause for concern.    Please let me know if you have any questions.    Sincerely,    Ruba Martínez PA-C    [facility] [place] [id number] [place] [address]  Ph: [phone]

## 2021-04-26 DIAGNOSIS — Z11.59 ENCOUNTER FOR SCREENING FOR OTHER VIRAL DISEASES: ICD-10-CM

## 2021-05-08 ENCOUNTER — HEALTH MAINTENANCE LETTER (OUTPATIENT)
Age: 56
End: 2021-05-08

## 2021-06-15 DIAGNOSIS — Z11.59 ENCOUNTER FOR SCREENING FOR OTHER VIRAL DISEASES: ICD-10-CM

## 2021-06-15 LAB
LABORATORY COMMENT REPORT: NORMAL
SARS-COV-2 RNA RESP QL NAA+PROBE: NEGATIVE
SARS-COV-2 RNA RESP QL NAA+PROBE: NORMAL
SPECIMEN SOURCE: NORMAL
SPECIMEN SOURCE: NORMAL

## 2021-06-15 PROCEDURE — U0005 INFEC AGEN DETEC AMPLI PROBE: HCPCS | Performed by: SPECIALIST

## 2021-06-15 PROCEDURE — U0003 INFECTIOUS AGENT DETECTION BY NUCLEIC ACID (DNA OR RNA); SEVERE ACUTE RESPIRATORY SYNDROME CORONAVIRUS 2 (SARS-COV-2) (CORONAVIRUS DISEASE [COVID-19]), AMPLIFIED PROBE TECHNIQUE, MAKING USE OF HIGH THROUGHPUT TECHNOLOGIES AS DESCRIBED BY CMS-2020-01-R: HCPCS | Performed by: SPECIALIST

## 2021-06-16 RX ORDER — LIDOCAINE 40 MG/G
CREAM TOPICAL
Status: CANCELLED | OUTPATIENT
Start: 2021-06-16

## 2021-06-16 RX ORDER — ONDANSETRON 2 MG/ML
4 INJECTION INTRAMUSCULAR; INTRAVENOUS
Status: CANCELLED | OUTPATIENT
Start: 2021-06-16

## 2021-06-17 ENCOUNTER — HOSPITAL ENCOUNTER (OUTPATIENT)
Facility: CLINIC | Age: 56
Discharge: HOME | End: 2021-06-17
Attending: SPECIALIST | Admitting: SPECIALIST
Payer: COMMERCIAL

## 2021-06-17 VITALS
DIASTOLIC BLOOD PRESSURE: 71 MMHG | OXYGEN SATURATION: 99 % | HEART RATE: 62 BPM | RESPIRATION RATE: 13 BRPM | BODY MASS INDEX: 28.17 KG/M2 | SYSTOLIC BLOOD PRESSURE: 113 MMHG | WEIGHT: 165 LBS | HEIGHT: 64 IN

## 2021-06-17 LAB — COLONOSCOPY: NORMAL

## 2021-06-17 PROCEDURE — G0500 MOD SEDAT ENDO SERVICE >5YRS: HCPCS | Performed by: SPECIALIST

## 2021-06-17 PROCEDURE — G0121 COLON CA SCRN NOT HI RSK IND: HCPCS | Performed by: SPECIALIST

## 2021-06-17 PROCEDURE — 99153 MOD SED SAME PHYS/QHP EA: CPT | Performed by: SPECIALIST

## 2021-06-17 PROCEDURE — 45378 DIAGNOSTIC COLONOSCOPY: CPT | Performed by: SPECIALIST

## 2021-06-17 PROCEDURE — 250N000011 HC RX IP 250 OP 636: Performed by: SPECIALIST

## 2021-06-17 RX ORDER — FENTANYL CITRATE 50 UG/ML
INJECTION, SOLUTION INTRAMUSCULAR; INTRAVENOUS PRN
Status: COMPLETED | OUTPATIENT
Start: 2021-06-17 | End: 2021-06-17

## 2021-06-17 RX ADMIN — MIDAZOLAM 2 MG: 1 INJECTION INTRAMUSCULAR; INTRAVENOUS at 11:17

## 2021-06-17 RX ADMIN — FENTANYL CITRATE 100 MCG: 50 INJECTION, SOLUTION INTRAMUSCULAR; INTRAVENOUS at 11:16

## 2021-06-17 RX ADMIN — FENTANYL CITRATE 50 MCG: 50 INJECTION, SOLUTION INTRAMUSCULAR; INTRAVENOUS at 11:24

## 2021-06-17 RX ADMIN — MIDAZOLAM 1 MG: 1 INJECTION INTRAMUSCULAR; INTRAVENOUS at 11:24

## 2021-06-17 ASSESSMENT — MIFFLIN-ST. JEOR: SCORE: 1333.44

## 2021-06-17 NOTE — H&P
Pre-Endoscopy History and Physical     Delores Desai MRN# [id number]   YOB: 1965 Age: 55 year old     Date of Procedure: 2021  Primary care provider: Cathi Shi  Type of Endoscopy: Colonoscopy with possible biopsy, possible polypectomy  Reason for Procedure: screening  Type of Anesthesia Anticipated: Conscious Sedation    HPI:    Delores is a 55 year old female who will be undergoing the above procedure.      A history and physical has been performed. The patient's medications and allergies have been reviewed. The risks and benefits of the procedure and the sedation options and risks were discussed with the patient.  All questions were answered and informed consent was obtained.      She denies a personal or family history of anesthesia complications or bleeding disorders.     Patient Active Problem List   Diagnosis     CARDIOVASCULAR SCREENING; LDL GOAL LESS THAN 160     GERD (gastroesophageal reflux disease)     Cough        Past Medical History:   Diagnosis Date     Cough 11/15/2013    cough each fall, responds to albuterol     Genital herpes     rare reoccurence     GERD (gastroesophageal reflux disease)      History of gestational diabetes mellitus, not pregnant      Pleurisy     episodic non-cardiac chest pain     Supervision of other normal pregnancy      - c sections x 2        Past Surgical History:   Procedure Laterality Date     C/SECTION, LOW TRANSVERSE      , Low Transverse     COLONOSCOPY  2011    normal, biopsies: negative for malignancy and microscopic colitis     CT CHEST PULMONARY EMBOLISM W CONTRAST  2014    no acute changes, stable tiny pulmonary nodules left lung base since      CT SCAN CHEST  2005    normal     CTA NECK WITH CONTRAST  2014    negative     ESOPHAGOSCOPY, GASTROSCOPY, DUODENOSCOPY (EGD), COMBINED  2011    small hiatal hernia, biopsies: neg H Pylori, neg for celiac, reactive gastropathy     SONO ABDOMEN  2011     "normal     SONO PELVIS  2011    anteroverted uterus with 2.6 mm endometrium, normal ovaries       Social History     Tobacco Use     Smoking status: Former Smoker     Quit date: 10/5/2010     Years since quitting: 10.7     Smokeless tobacco: Never Used     Tobacco comment: social smoker  - quit completely 10/2010   Substance Use Topics     Alcohol use: Yes     Comment: 4 a week        Family History   Problem Relation Age of Onset     Breast Cancer Mother         onset age 60     Hypertension Mother      Osteoarthritis Mother         TKA and SAGRARIO for osteoarthritis     Gastrointestinal Disease Father         gallstone pancreatitis     C.A.D. Paternal Grandmother         age 90     Diabetes Paternal Grandmother         type 2     C.A.D. Paternal Uncle         late 50s and had CABG     Diabetes Paternal Uncle      Cardiovascular Paternal Uncle         carotid artery disease     Cerebrovascular Disease Paternal Uncle      Gastrointestinal Disease Brother         cholecystectomy     Breast Cancer Maternal Aunt         x 2, one onset age 50 and one onset age 70s     Cancer - colorectal Maternal Aunt         onset age 68     Cancer Other         mat cousin with esophageal cancer,  age 50       Prior to Admission medications    Medication Sig Start Date End Date Taking? Authorizing Provider   cyclobenzaprine (FLEXERIL) 5 MG tablet TAKE 1 TABLET BY MOUTH THREE TIMES A DAY AS NEEDED FOR MUSCLE SPASMS 1/15/21   Reported, Patient       Allergies   Allergen Reactions     No Known Allergies         REVIEW OF SYSTEMS:   5 point ROS negative except as noted above in HPI, including Gen., Resp., CV, GI &  system review.    PHYSICAL EXAM:   /79   Pulse 71   Resp 16   Ht 1.626 m (5' 4\")   Wt 74.8 kg (165 lb)   LMP 2018   SpO2 98%   BMI 28.32 kg/m   Estimated body mass index is 28.32 kg/m  as calculated from the following:    Height as of this encounter: 1.626 m (5' 4\").    Weight as of this encounter: 74.8 kg " (165 lb).   GENERAL APPEARANCE: alert, and oriented  MENTAL STATUS: alert  AIRWAY EXAM: Mallampatti Class I (visualization of the soft palate, fauces, uvula, anterior and posterior pillars)  RESP: lungs clear to auscultation - no rales, rhonchi or wheezes  CV: regular rates and rhythm  DIAGNOSTICS:    Not indicated    IMPRESSION   ASA Class 1 - Healthy patient, no medical problems    PLAN:   Plan for Colonoscopy with possible biopsy, possible polypectomy. We discussed the risks, benefits and alternatives and the patient wished to proceed.    The above has been forwarded to the consulting provider.      Signed Electronically by: Gregory Luna MD  June 17, 2021

## 2021-10-23 ENCOUNTER — HEALTH MAINTENANCE LETTER (OUTPATIENT)
Age: 56
End: 2021-10-23

## 2021-12-13 ENCOUNTER — TELEPHONE (OUTPATIENT)
Dept: FAMILY MEDICINE | Facility: CLINIC | Age: 56
End: 2021-12-13
Payer: COMMERCIAL

## 2021-12-13 NOTE — TELEPHONE ENCOUNTER
Patient Quality Outreach    Patient is due for the following:   Breast Cancer Screening - Mammogram  Physical  - Due after 06/23/2015    NEXT STEPS:   Schedule a yearly physical    Type of outreach:    Sent [employer] message.    Next Steps:  Reach out within 90 days via [employer].    Max number of attempts reached: No. Will try again in 90 days if patient still on fail list.    Questions for provider review:    None     Julia Dillon

## 2021-12-13 NOTE — LETTER
December 30, 2021      Delores [facility]  [address] [place] [address]        Dear Delores,    I care about your health and have reviewed your health plan. I have reviewed your medical conditions, medication list, and lab results and am making recommendations based on this review, to better manage your health.    You are in particular need of attention regarding:  -Breast Cancer Screening  -Wellness (Physical) Visit     I am recommending that you:  -schedule a WELLNESS (Physical) APPOINTMENT with me.   I will check fasting labs the same day - nothing to eat except water and meds for 8-10 hours prior.  -schedule a MAMMOGRAM which is due. We have a mobile mammogram unit that comes to [facility].To schedule please call the clinic at [phone]. Or, you can call [facility] at [phone] to schedule this.  Please disregard this reminder if you have had this exam elsewhere within the last year.  It would be helpful for us to have a copy of your mammogram report in our file so that we can best coordinate your care.    Here is a list of Health Maintenance topics that are due now or due soon:  Health Maintenance Due   Topic Date Due     ANNUAL REVIEW OF HM ORDERS  Never done     Discuss Advance Care Planning  Never done     HIV Screening  Never done     Hepatitis C Screening  Never done     Preventive Care Visit  06/23/2015     Mammogram  01/20/2016     Zoster (Shingles) Vaccine (1 of 2) Never done     LUNG CANCER SCREENING  08/25/2016     Flu Vaccine (1) 09/01/2021     COVID-19 Vaccine (3 - Booster for Pfizer series) 10/09/2021       Please call us at [phone] (or use [employer]) to address the above recommendations.     Thank you for trusting [facility] and we appreciate the opportunity to serve you.  We look forward to supporting your healthcare needs in the future.    Healthy Regards,    Kim Caraballo MD

## 2021-12-30 NOTE — TELEPHONE ENCOUNTER
Patient Quality Outreach    Patient is due for the following:   Breast Cancer Screening - Mammogram  Physical  - Due after 06/23/2014    NEXT STEPS:   Schedule a yearly physical    Type of outreach:    Phone, left message for patient/parent to call back. and Sent letter.    Next Steps:  Reach out within 90 days via Phone, MyChart and Letter.    Max number of attempts reached: Yes. Will try again in 90 days if patient still on fail list.    Questions for provider review:    None     Julia THOMAS Dillon

## 2022-02-12 ENCOUNTER — HEALTH MAINTENANCE LETTER (OUTPATIENT)
Age: 57
End: 2022-02-12

## 2022-10-10 ENCOUNTER — HEALTH MAINTENANCE LETTER (OUTPATIENT)
Age: 57
End: 2022-10-10

## 2022-12-22 ENCOUNTER — NURSE TRIAGE (OUTPATIENT)
Dept: FAMILY MEDICINE | Facility: CLINIC | Age: 57
End: 2022-12-22

## 2022-12-22 NOTE — TELEPHONE ENCOUNTER
"Nurse Triage SBAR    Is this a 2nd Level Triage? YES, LICENSED PRACTITIONER REVIEW IS REQUIRED    Situation:     Patient calling stating \"I thought I had the stomach flu\"    Background:     Reports yesterday having nausea, vomiting and diarrhea which has now resolved but patient now has abdominal pain.     Assessment:     LOCATION: above the belly button in the center of abdomen   RADIATION: radiation into lower back occasionally   ONSET: this morning   SUDDEN: \"I woke up and it was there\"  PATTERN: constant   SEVERITY: 5/10 \"it's just very uncomfortable\"  RECURRENT SYMPTOM: denies   CAUSE: \"I thought I had the stomach flu\"  RELIEVING/AGGRAVATING: seems to be better after eating/drinking. Denies any known aggravating factors   CARDIAC SYMPTOMS: denies chest pain, difficulty breathing, sweating. Does report having nausea since yesterday   OTHER SYMPTOMS: denies fever, pain with urination, back pain     Denies any respiratory symptoms     Protocol Recommended Disposition:     Go to ED Now     Recommendation:     Protocol recommends patient to go to ED now for pain lasting over 10 minutes and over 50 years old.    Patient states she would like to check recommendation with provider as she feels the pain is due to a stomach flu.    Please advise if patient should be seen in person or continue to monitor?    Routed to provider    Does the patient meet one of the following criteria for ADS visit consideration? 16+ years old, with an [facility] PCP     TIP  Providers, please consider if this condition is appropriate for management at one of our Acute and Diagnostic Services sites.     If patient is a good candidate, please use dotphrase <dot>triageresponse and select Refer to ADS to document.    Writer advised if any new or worsening symptoms severe abdominal pain chest pain difficulty breathing etc patient would need to be seen in ER. Patient expressed verbal understanding and is agreeable.     Callback [phone] - ok to " leave detailed VM     Bill Ibrahim RN  [facility]    Reason for Disposition    Pain is mainly in upper abdomen (if needed ask: 'is it mainly above the belly button?')    Pain lasting > 10 minutes and over 50 years old    Additional Information    Negative: Passed out (i.e., fainted, collapsed and was not responding)    Negative: Shock suspected (e.g., cold/pale/clammy skin, too weak to stand, low BP, rapid pulse)    Negative: Sounds like a life-threatening emergency to the triager    Negative: Chest pain    Negative: Passed out (i.e., fainted, collapsed and was not responding)    Negative: Shock suspected (e.g., cold/pale/clammy skin, too weak to stand, low BP, rapid pulse)    Negative: Visible sweat on face or sweat is dripping down    Negative: Chest pain    Negative: SEVERE abdominal pain (e.g., excruciating)    Protocols used: ABDOMINAL PAIN - FEMALE-A-OH, ABDOMINAL PAIN - UPPER-A-OH

## 2022-12-22 NOTE — TELEPHONE ENCOUNTER
Patient thinks her abdominal pain may be because she has not ate.     She will try a soft diet.     Sh has no fever. Nausea and vomiting has gone away.     Patient told if abdominal pain, and other symptoms come back and she is not getting better, she should seek ED care.     Vira Mckeon RN  [facility]

## 2023-03-25 ENCOUNTER — HEALTH MAINTENANCE LETTER (OUTPATIENT)
Age: 58
End: 2023-03-25

## 2023-05-27 ENCOUNTER — HEALTH MAINTENANCE LETTER (OUTPATIENT)
Age: 58
End: 2023-05-27

## 2024-05-13 ENCOUNTER — TRANSCRIBE ORDERS (OUTPATIENT)
Dept: OTHER | Age: 59
End: 2024-05-13

## 2024-05-13 DIAGNOSIS — M54.2 NECK PAIN: Primary | ICD-10-CM

## 2024-05-26 ENCOUNTER — HEALTH MAINTENANCE LETTER (OUTPATIENT)
Age: 59
End: 2024-05-26

## 2025-01-27 ENCOUNTER — LAB REQUISITION (OUTPATIENT)
Dept: LAB | Facility: CLINIC | Age: 60
End: 2025-01-27
Payer: COMMERCIAL

## 2025-01-27 DIAGNOSIS — R21 RASH AND OTHER NONSPECIFIC SKIN ERUPTION: ICD-10-CM

## 2025-01-27 PROCEDURE — 87070 CULTURE OTHR SPECIMN AEROBIC: CPT | Mod: ORL | Performed by: DERMATOLOGY

## 2025-01-29 LAB — BACTERIA WND CULT: NORMAL

## (undated) RX ORDER — FENTANYL CITRATE 50 UG/ML
INJECTION, SOLUTION INTRAMUSCULAR; INTRAVENOUS
Status: DISPENSED
Start: 2021-06-17